# Patient Record
Sex: FEMALE | Race: WHITE | NOT HISPANIC OR LATINO | Employment: FULL TIME | URBAN - METROPOLITAN AREA
[De-identification: names, ages, dates, MRNs, and addresses within clinical notes are randomized per-mention and may not be internally consistent; named-entity substitution may affect disease eponyms.]

---

## 2016-12-30 NOTE — PATIENT INSTRUCTIONS

## 2016-12-30 NOTE — PROGRESS NOTES
SUBJECTIVE:     CC: Audra Almanzar is an 26 year old woman who presents for preventive health visit.     Healthy Habits:    Do you get at least three servings of calcium containing foods daily (dairy, green leafy vegetables, etc.)? yes and no, taking calcium and/or vitamin D supplement: yes - calcium    Amount of exercise or daily activities, outside of work: 0 day(s) per week    Problems taking medications regularly No    Medication side effects: No    Have you had an eye exam in the past two years? no    Do you see a dentist twice per year? yes    Do you have sleep apnea, excessive snoring or daytime drowsiness?daytime drowsiness    Asthma is well controlled  Sleep is better    -------------------------------------    Today's PHQ-2 Score:   PHQ-2 ( 1999 Pf[employer]) 10/16/2015 10/16/2015   Q1: Little interest or pleasure in doing things 0 0   Q2: Feeling down, depressed or hopeless 0 0   PHQ-2 Score 0 0       Abuse: Current or Past(Physical, Sexual or Emotional)- No  Do you feel safe in your environment - Yes    Social History   Substance Use Topics     Smoking status: Former Smoker -- 0.00 packs/day     Types: Cigarettes     Quit date: 03/11/2014     Smokeless tobacco: Never Used      Comment: 1-2 per month     Alcohol Use: Yes      Comment: 4 per week     The patient does not drink >3 drinks per day nor >7 drinks per week.    Recent Labs   Lab Test  03/11/15   1049  01/05/11   1027   CHOL  138  135   HDL  73  60   LDL  48  62   TRIG  87  64   CHOLHDLRATIO  1.9  2.2       Reviewed orders with patient.  Reviewed health maintenance and updated orders accordingly - Yes    Mammo Decision Support:  Mammogram not appropriate for this patient based on age.    Pertinent mammograms are reviewed under the imaging tab.  History of abnormal Pap smear: YES - updated in Problem List and Health Maintenance accordingly  All Histories reviewed and updated in Epic.      ROS:  C: NEGATIVE for fever, chills, change in weight  I:  NEGATIVE for worrisome rashes, moles or lesions  E: NEGATIVE for vision changes or irritation  ENT: NEGATIVE for ear, mouth and throat problems  R: NEGATIVE for significant cough or SOB  B: NEGATIVE for masses, tenderness or discharge  CV: NEGATIVE for chest pain, palpitations or peripheral edema  GI: NEGATIVE for nausea, abdominal pain, heartburn, or change in bowel habits  : NEGATIVE for unusual urinary or vaginal symptoms. Periods are regular.  M: NEGATIVE for significant arthralgias or myalgia  N: NEGATIVE for weakness, dizziness or paresthesias  E: NEGATIVE for temperature intolerance, skin/hair changes  P: NEGATIVE for changes in mood or affect    Problem list, Medication list, Allergies, and Medical/Social/Surgical histories reviewed in [facility] and updated as appropriate.  Labs reviewed in EPIC  OBJECTIVE:     /82 mmHg  Pulse 89  Temp(Src) 97.4  F (36.3  C) (Oral)  Wt 165 lb 4.8 oz (74.98 kg)  SpO2 96%  EXAM:  GENERAL: healthy, alert and no distress  EYES: Eyes grossly normal to inspection, PERRL and conjunctivae and sclerae normal  HENT: ear canals and TM's normal, nose and mouth without ulcers or lesions  NECK: no adenopathy, no asymmetry, masses, or scars and thyroid normal to palpation  RESP: lungs clear to auscultation - no rales, rhonchi or wheezes  BREAST: normal without masses, tenderness or nipple discharge and no palpable axillary masses or adenopathy  CV: regular rate and rhythm, normal S1 S2, no S3 or S4, no murmur, click or rub, no peripheral edema and peripheral pulses strong  ABDOMEN: soft, nontender, no hepatosplenomegaly, no masses and bowel sounds normal  MS: no gross musculoskeletal defects noted, no edema  SKIN: no suspicious lesions or rashes  NEURO: Normal strength and tone, mentation intact and speech normal  PSYCH: mentation appears normal, affect normal/bright    ASSESSMENT/PLAN:         ICD-10-CM    1. Routine general medical examination at a health care facility Z00.00   "  2. Mild persistent asthma, uncomplicated J45.30 fluticasone-salmeterol (ADVAIR) 250-50 MCG/DOSE diskus inhaler     albuterol (PROAIR HFA/PROVENTIL HFA/VENTOLIN HFA) 108 (90 BASE) MCG/ACT Inhaler   3. Foot dermatitis L30.9 desoximetasone (TOPICORT) 0.25 %   4. CONSTANTINO (generalized anxiety disorder) F41.1 FLUoxetine (PROZAC) 20 MG capsule   5. Encounter for surveillance of contraceptive pills Z30.41 drospirenone-ethinyl estradiol (OCELLA) 3-0.03 MG per tablet     Asthma controlled, anxiety under control. She will stay on prozac until the end of the year and then consider starting therapy. Doesn't have time to start therapy yet. Return to clinic for any new or worsening symptoms or go to ER Urgent care in off hours      COUNSELING:   Reviewed preventive health counseling, as reflected in patient instructions         reports that she quit smoking about 2 years ago. Her smoking use included Cigarettes. She smoked 0.00 packs per day. She has never used smokeless tobacco.    Estimated body mass index is 24.97 kg/(m^2) as calculated from the following:    Height as of 3/11/15: 5' 8.23\" (1.733 m).    Weight as of this encounter: 165 lb 4.8 oz (74.98 kg).       Counseling Resources:  ATP IV Guidelines  Pooled Cohorts Equation Calculator  Breast Cancer Risk Calculator  FRAX Risk Assessment  ICSI Preventive Guidelines  Dietary Guidelines for Americans, 2010  USDA's MyPlate  ASA Prophylaxis  Lung CA Screening    Mora Chang PA-C  [facility] [place]   "

## 2017-01-05 ENCOUNTER — OFFICE VISIT (OUTPATIENT)
Dept: FAMILY MEDICINE | Facility: CLINIC | Age: 27
End: 2017-01-05
Payer: COMMERCIAL

## 2017-01-05 VITALS
DIASTOLIC BLOOD PRESSURE: 82 MMHG | OXYGEN SATURATION: 96 % | BODY MASS INDEX: 24.97 KG/M2 | HEART RATE: 89 BPM | TEMPERATURE: 97.4 F | WEIGHT: 165.3 LBS | SYSTOLIC BLOOD PRESSURE: 122 MMHG

## 2017-01-05 DIAGNOSIS — L30.9 FOOT DERMATITIS: ICD-10-CM

## 2017-01-05 DIAGNOSIS — Z30.41 ENCOUNTER FOR SURVEILLANCE OF CONTRACEPTIVE PILLS: ICD-10-CM

## 2017-01-05 DIAGNOSIS — Z00.00 ROUTINE GENERAL MEDICAL EXAMINATION AT A HEALTH CARE FACILITY: Primary | ICD-10-CM

## 2017-01-05 DIAGNOSIS — J45.30 MILD PERSISTENT ASTHMA, UNCOMPLICATED: ICD-10-CM

## 2017-01-05 DIAGNOSIS — F41.1 GAD (GENERALIZED ANXIETY DISORDER): ICD-10-CM

## 2017-01-05 PROCEDURE — 99395 PREV VISIT EST AGE 18-39: CPT | Performed by: PHYSICIAN ASSISTANT

## 2017-01-05 RX ORDER — ALBUTEROL SULFATE 90 UG/1
2 AEROSOL, METERED RESPIRATORY (INHALATION) EVERY 6 HOURS PRN
Qty: 3 INHALER | Refills: 3 | Status: SHIPPED | OUTPATIENT
Start: 2017-01-05 | End: 2020-08-26

## 2017-01-05 RX ORDER — DESOXIMETASONE 2.5 MG/ML
SPRAY TOPICAL 2 TIMES DAILY
Qty: 3 BOTTLE | Refills: 3 | Status: SHIPPED | OUTPATIENT
Start: 2017-01-05 | End: 2019-06-05

## 2017-01-05 RX ORDER — DROSPIRENONE AND ETHINYL ESTRADIOL 0.03MG-3MG
1 KIT ORAL DAILY
Qty: 84 TABLET | Refills: 4 | Status: SHIPPED | OUTPATIENT
Start: 2017-01-05 | End: 2018-03-20

## 2017-01-05 NOTE — Clinical Note
My Asthma Action Plan  Name: Audra Almanzar   YOB: 1990  Date: 12/30/2016   My doctor: Meka Hinkle   My clinic: [facility] [place]      My Control Medicine: Fluticasone+salmeterol (Advair) -  Diskus 250/50 mcg        Dose: 1 puff every 12 hours  My Rescue Medicine: Albuterol (Proair/Ventolin/Proventil) HFA        Dose: 2 puffs every 6 hours as needed   My Asthma Severity: mild persistent  Avoid your asthma triggers: Triggers        GREEN ZONE   Good Control    I feel good    No cough or wheeze    Can work, sleep and play without asthma symptoms       Take your asthma control medicine every day.     1. If exercise triggers your asthma, take your rescue medication    15 minutes before exercise or sports, and    During exercise if you have asthma symptoms  2. Spacer to use with inhaler: If you have a spacer, make sure to use it with your inhaler             YELLOW ZONE Getting Worse  I have ANY of these:    I do not feel good    Cough or wheeze    Chest feels tight    Wake up at night   1. Keep taking your Green Zone medications  2. Start taking your rescue medicine:    every 20 minutes for up to 1 hour. Then every 4 hours for 24-48 hours.  3. If you stay in the Yellow Zone for more than 12-24 hours, contact your doctor.  4. If you do not return to the Green Zone in 12-24 hours or you get worse, start taking your oral steroid medicine if prescribed by your provider.           RED ZONE Medical Alert - Get Help  I have ANY of these:    I feel awful    Medicine is not helping    Breathing getting harder    Trouble walking or talking    Nose opens wide to breathe       1. Take your rescue medicine NOW  2. If your provider has prescribed an oral steroid medicine, start taking it NOW  3. Call your doctor NOW  4. If you are still in the Red Zone after 20 minutes and you have not reached your doctor:    Take your rescue medicine again and    Call 911 or go to the emergency room right  away    See your regular doctor within 2 weeks of an Emergency Room or Urgent Care visit for follow-up treatment.        The above medication may be given at school or day care?: N/A (Adult Patient)  Child can carry and use inhaler(s) at school with approval of school nurse?: N/A (Adult Patient)    Electronically signed by: Saumya Meyer, December 30, 2016    Annual Reminders:  Meet with Asthma Educator,  Flu Shot in the Fall, consider Pneumonia Vaccination for patients with asthma (aged 19 and older).    Pharmacy[facility]    [facility] [place] [address]  [facility] [place], [place] - [address]  [facility]  [facility] [place] - [address]  [facility] [place] [facility] [address]  [facility] [place], [place] - [address]                    Asthma Triggers  How To Control Things That Make Your Asthma Worse    Triggers are things that make your asthma worse.  Look at the list below to help you find your triggers and what you can do about them.  You can help prevent asthma flare-ups by staying away from your triggers.      Trigger                                                          What you can do   Cigarette Smoke  Tobacco smoke can make asthma worse. Do not allow smoking in your home, car or around you.  Be sure no one smokes at a child s day care or school.  If you smoke, ask your health care provider for ways to help you quit.  Ask family members to quit too.  Ask your health care provider for a referral to Quit Plan to help you quit smoking, or call 3-253-075-PLAN.     Colds, Flu, Bronchitis  These are common triggers of asthma. Wash your hands often.  Don t touch your eyes, nose or mouth.  Get a flu shot every year.     Dust Mites  These are tiny bugs that live in cloth or carpet. They are too small to see. Wash sheets and blankets in hot water every week.    Encase pillows and mattress in dust mite proof covers.  Avoid having carpet if you can. If you have carpet, vacuum weekly.   Use a dust mask and HEPA vacuum.   Pollen and Outdoor Mold  Some people are allergic to trees, grass, or weed pollen, or molds. Try to keep your windows closed.  Limit time out doors when pollen count is high.   Ask you health care provider about taking medicine during allergy season.     Animal Dander  Some people are allergic to skin flakes, urine or saliva from pets with fur or feathers. Keep pets with fur or feathers out of your home.    If you can t keep the pet outdoors, then keep the pet out of your bedroom.  Keep the bedroom door closed.  Keep pets off cloth furniture and away from stuffed toys.     Mice, Rats, and Cockroaches  Some people are allergic to the waste from these pests.   Cover food and garbage.  Clean up spills and food crumbs.  Store grease in the refrigerator.   Keep food out of the bedroom.   Indoor Mold  This can be a trigger if your home has high moisture. Fix leaking faucets, pipes, or other sources of water.   Clean moldy surfaces.  Dehumidify basement if it is damp and smelly.   Smoke, Strong Odors, and Sprays  These can reduce air quality. Stay away from strong odors and sprays, such as perfume, powder, hair spray, paints, smoke incense, paint, cleaning products, candles and new carpet.   Exercise or Sports  Some people with asthma have this trigger. Be active!  Ask your doctor about taking medicine before sports or exercise to prevent symptoms.    Warm up for 5-10 minutes before and after sports or exercise.     Other Triggers of Asthma  Cold air:  Cover your nose and mouth with a scarf.  Sometimes laughing or crying can be a trigger.  Some medicines and food can trigger asthma.

## 2017-01-05 NOTE — NURSING NOTE
"Chief Complaint   Patient presents with     Physical       Initial /82 mmHg  Pulse 89  Temp(Src) 97.4  F (36.3  C) (Oral)  Wt 165 lb 4.8 oz (74.98 kg)  SpO2 96% Estimated body mass index is 24.97 kg/(m^2) as calculated from the following:    Height as of 3/11/15: 5' 8.23\" (1.733 m).    Weight as of this encounter: 165 lb 4.8 oz (74.98 kg).  BP completed using cuff size: regular  Saumya Meyer MA      "

## 2017-06-01 ENCOUNTER — OFFICE VISIT (OUTPATIENT)
Dept: FAMILY MEDICINE | Facility: CLINIC | Age: 27
End: 2017-06-01
Payer: COMMERCIAL

## 2017-06-01 VITALS
WEIGHT: 166.4 LBS | TEMPERATURE: 97.8 F | BODY MASS INDEX: 25.22 KG/M2 | OXYGEN SATURATION: 100 % | HEART RATE: 80 BPM | DIASTOLIC BLOOD PRESSURE: 90 MMHG | SYSTOLIC BLOOD PRESSURE: 130 MMHG | HEIGHT: 68 IN

## 2017-06-01 DIAGNOSIS — Z01.84 IMMUNITY STATUS TESTING: ICD-10-CM

## 2017-06-01 DIAGNOSIS — F41.1 GAD (GENERALIZED ANXIETY DISORDER): ICD-10-CM

## 2017-06-01 DIAGNOSIS — F90.0 ATTENTION DEFICIT HYPERACTIVITY DISORDER (ADHD), PREDOMINANTLY INATTENTIVE TYPE: Primary | ICD-10-CM

## 2017-06-01 DIAGNOSIS — Z11.1 SCREENING EXAMINATION FOR PULMONARY TUBERCULOSIS: ICD-10-CM

## 2017-06-01 DIAGNOSIS — E55.9 VITAMIN D DEFICIENCY: ICD-10-CM

## 2017-06-01 DIAGNOSIS — J45.30 MILD PERSISTENT ASTHMA WITHOUT COMPLICATION: ICD-10-CM

## 2017-06-01 LAB — DEPRECATED CALCIDIOL+CALCIFEROL SERPL-MC: 16 UG/L (ref 20–75)

## 2017-06-01 PROCEDURE — 82306 VITAMIN D 25 HYDROXY: CPT | Performed by: PHYSICIAN ASSISTANT

## 2017-06-01 PROCEDURE — 86787 VARICELLA-ZOSTER ANTIBODY: CPT | Performed by: PHYSICIAN ASSISTANT

## 2017-06-01 PROCEDURE — 86480 TB TEST CELL IMMUN MEASURE: CPT | Performed by: PHYSICIAN ASSISTANT

## 2017-06-01 PROCEDURE — 99213 OFFICE O/P EST LOW 20 MIN: CPT | Performed by: PHYSICIAN ASSISTANT

## 2017-06-01 PROCEDURE — 36415 COLL VENOUS BLD VENIPUNCTURE: CPT | Performed by: PHYSICIAN ASSISTANT

## 2017-06-01 NOTE — PATIENT INSTRUCTIONS
probiotic 30 billion units (3rd party testing)-  i'll contact you with results of vit d  Fish oil with EPA DHA 1-2 grams daily. Put in the freezer and take night with meal  Continue your medications  Good luck on your boards!  Return to clinic for any new or worsening symptoms or go to ER Urgent care in off hours

## 2017-06-01 NOTE — LETTER
[address] [address]  [phone]  Dept: [phone]      6/1/2017    Re: Audra Almanzar      TO WHOM IT MAY CONCERN:    Due to Audra Almanzar's disabilities with diagnosis of ADHD, anxiety disorder and depression, I request that she have the accomodation of taking the NCLEX exam in a private room.     Cordially,            Mora Chang PA-C  [facility] [place]

## 2017-06-01 NOTE — LETTER
[facility]  [address] [place] [id number] [phone]      June 2, 2017      Audra FABIOLA Jenelle  [address] [address]          Dear MsNeville Almanzar,    The results of your recent lab tests show low vitamin D. Start over the counter vitamin D3 5,000 IU's daily. Repeat labs in 6 weeks and we'll readjust. It's very important to repeat labs so we can be sure the level doesn't become toxic (this is a fat soluble vitamin)  Enclosed is a copy of these results.  If you have any further questions or problems, please contact our office.    Sincerely,        Mora Chang PA-C        Results for orders placed or performed in visit on 06/01/17   Varicella Zoster Virus Antibody IgG   Result Value Ref Range    Varicella Zoster Virus Antibody IgG 3.1 (H) 0.0 - 0.8 AI   Vitamin D Deficiency   Result Value Ref Range    Vitamin D Deficiency screening 16 (L) 20 - 75 ug/L

## 2017-06-01 NOTE — PROGRESS NOTES
SUBJECTIVE:                                                    Audra Almanzar is a 27 year old female who presents to clinic today for the following health issues:    Has forms to fill out for school. Chicken pox titer and get a TB done.     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    Currently taking multivitamin, metabolism booster? Thinks green tea extract. Also calcium with vitamin D        Problem list and histories reviewed & adjusted, as indicated.  Additional history: as documented    ROS:  C: NEGATIVE for fever, chills, change in weight  E/M: NEGATIVE for ear, mouth and throat problems  R: NEGATIVE for significant cough or SOB  CV: NEGATIVE for chest pain, palpitations or peripheral edema    Patient Active Problem List   Diagnosis     CARDIOVASCULAR SCREENING; LDL GOAL LESS THAN 130     Foot dermatitis     Mild persistent asthma     Dermatitis     Anxiety disorder     ASCUS (atypical squamous cells of undetermined significance) on Pap smear     Mild persistent asthma, uncomplicated     Past Surgical History:   Procedure Laterality Date     ENT SURGERY         Social History   Substance Use Topics     Smoking status: Former Smoker     Packs/day: 0.00     Types: Cigarettes     Quit date: 3/11/2014     Smokeless tobacco: Never Used      Comment: 1-2 per month     Alcohol use Yes      Comment: 4 per week     Family History   Problem Relation Age of Onset     Gynecology Mother      Depression/Anxiety Mother      Asthma Father      CANCER Maternal Grandmother      bladder     Respiratory Maternal Grandfather      Respiratory Paternal Grandmother      Alcohol/Drug Brother            Problem list, Medication list, Allergies, and Medical/Social/Surgical histories reviewed in [facility] and updated as appropriate.  Labs reviewed in EPIC    OBJECTIVE:                                                    /90 (BP Location: Right arm, Patient Position:  "Chair, Cuff Size: Adult Regular)  Pulse 80  Temp 97.8  F (36.6  C) (Oral)  Ht 5' 7.72\" (1.72 m)  Wt 166 lb 6.4 oz (75.5 kg)  LMP 05/16/2017 (Approximate)  SpO2 100%  BMI 25.51 kg/m2 Body mass index is 25.51 kg/(m^2).   GENERAL:: healthy, alert and no distress         ASSESSMENT/PLAN:                                                        ICD-10-CM    1. Attention deficit hyperactivity disorder (ADHD), predominantly inattentive type F90.0    2. Screening examination for pulmonary tuberculosis Z11.1 M Tuberculosis by Quantiferon   3. Immunity status testing Z01.84 Varicella Zoster Virus Antibody IgG   4. CONSTANTINO (generalized anxiety disorder) F41.1    5. Mild persistent asthma without complication J45.30    6. Vitamin D deficiency E55.9 Vitamin D Deficiency     > 15 minutes were spent with the patient and / or family present in education and / or counseling regarding appropriate supplements to help optimize her health, signing paperwork and discussing her ADD and anxiety.  This represented more than 50% of the time spent interacting with the patient during this visit.     Patient Instructions   probiotic 30 billion units (3rd party testing)-  i'll contact you with results of vit d  Fish oil with EPA DHA 1-2 grams daily. Put in the freezer and take night with meal  Continue your medications  Good luck on your boards!  Return to clinic for any new or worsening symptoms or go to ER Urgent care in off hours            Estimated body mass index is 25.51 kg/(m^2) as calculated from the following:    Height as of this encounter: 5' 7.72\" (1.72 m).    Weight as of this encounter: 166 lb 6.4 oz (75.5 kg).       Mora Riggs [facility] [place]  [facility] [place]    "

## 2017-06-01 NOTE — MR AVS SNAPSHOT
After Visit Summary   6/1/2017    Audra Almanzar    MRN: [id number]           Patient Information     Date Of Birth          1990        Visit Information        Provider Department      6/1/2017 8:00 AM Mora Chang PA-C [facility] [place]        Today's Diagnoses     Attention deficit hyperactivity disorder (ADHD), predominantly inattentive type    -  1    Screening examination for pulmonary tuberculosis        Immunity status testing        CONSTANTINO (generalized anxiety disorder)        Mild persistent asthma without complication        Vitamin D deficiency          Care Instructions    probiotic 30 billion units (3rd party testing)-  i'll contact you with results of vit d  Fish oil with EPA DHA 1-2 grams daily. Put in the freezer and take night with meal  Continue your medications  Good luck on your boards!  Return to clinic for any new or worsening symptoms or go to ER Urgent care in off hours              Follow-ups after your visit        Who to contact     If you have questions or need follow up information about today's clinic visit or your schedule please contact [facility] [place] directly at [phone].  Normal or non-critical lab and imaging results will be communicated to you by [employer], letter or phone within 4 business days after the clinic has received the results. If you do not hear from us within 7 days, please contact the clinic through Oreet or phone. If you have a critical or abnormal lab result, we will notify you by phone as soon as possible.  Submit refill requests through Mayomi or call your pharmacy and they will forward the refill request to us. Please allow 3 business days for your refill to be completed.          Additional Information About Your Visit        [employer] Information     Mayomi lets you send messages to your doctor, view your test results, renew your prescriptions, schedule appointments and more. To sign up, go to  [email or web address] . Click on \"Log in\" on the left side of the screen, which will take you to the Welcome page. Then click on \"Sign up Now\" on the right side of the page.     You will be asked to enter the access code listed below, as well as some personal information. Please follow the directions to create your username and password.     Your access code is: [email or web address]  Expires: 2017  8:38 AM     Your access code will  in 90 days. If you need help or a new code, please call your [place] clinic or [phone].        Care EveryWhere ID     This is your Care EveryWhere ID. This could be used by other organizations to access your [place] medical records  [id number]        Your Vitals Were     Pulse Temperature Height Last Period Pulse Oximetry BMI (Body Mass Index)    80 97.8  F (36.6  C) (Oral) 5' 7.72\" (1.72 m) 2017 (Approximate) 100% 25.51 kg/m2       Blood Pressure from Last 3 Encounters:   17 130/90   17 122/82   10/16/15 122/86    Weight from Last 3 Encounters:   17 166 lb 6.4 oz (75.5 kg)   17 165 lb 4.8 oz (75 kg)   03/11/15 150 lb 9.6 oz (68.3 kg)              We Performed the Following     M Tuberculosis by Quantiferon     Varicella Zoster Virus Antibody IgG     Vitamin D Deficiency        Primary Care Provider Office Phone # Fax #    Meka Hinkle PA-C [phone]       XXX NO INFO FOUND XXX XXX  XXX [place] [address]        Thank you!     Thank you for choosing [facility] [place]  for your care. Our goal is always to provide you with excellent care. Hearing back from our patients is one way we can continue to improve our services. Please take a few minutes to complete the written survey that you may receive in the mail after your visit with us. Thank you!             Your Updated Medication List - Protect others around you: Learn how to safely use, store and throw away your medicines at www.disposemymeds.org.          This list is " accurate as of: 6/1/17  8:38 AM.  Always use your most recent med list.                   Brand Name Dispense Instructions for use    albuterol 108 (90 BASE) MCG/ACT Inhaler    PROAIR HFA/PROVENTIL HFA/VENTOLIN HFA    3 Inhaler    Inhale 2 puffs into the lungs every 6 hours as needed for shortness of breath / dyspnea       Biotin 5000 MCG Caps      Take  by mouth daily.       cetirizine 10 MG tablet    ZYRTEC ALLERGY    90 tablet    Take 1 tablet (10 mg) by mouth daily       desoximetasone 0.25 %    TOPICORT    3 Bottle    Apply topically 2 times daily       drospirenone-ethinyl estradiol 3-0.03 MG per tablet    OCELLA    84 tablet    Take 1 tablet by mouth daily       FLUoxetine 20 MG capsule    PROzac    90 capsule    Take 1 capsule (20 mg) by mouth daily Annual exam needed before any further refills, call [phone] to schedule       fluticasone-salmeterol 250-50 MCG/DOSE diskus inhaler    ADVAIR    3 Inhaler    Inhale 1 puff into the lungs every 12 hours

## 2017-06-01 NOTE — NURSING NOTE
"Chief Complaint   Patient presents with     Forms     Varicella     Titer     Mantoux Administration       Initial /90 (BP Location: Right arm, Patient Position: Chair, Cuff Size: Adult Regular)  Pulse 80  Temp 97.8  F (36.6  C) (Oral)  Ht 5' 7.72\" (1.72 m)  Wt 166 lb 6.4 oz (75.5 kg)  LMP 05/16/2017 (Approximate)  SpO2 100%  BMI 25.51 kg/m2 Estimated body mass index is 25.51 kg/(m^2) as calculated from the following:    Height as of this encounter: 5' 7.72\" (1.72 m).    Weight as of this encounter: 166 lb 6.4 oz (75.5 kg).  Medication Reconciliation: complete     Caleb Crane MA      "

## 2017-06-02 LAB — VZV IGG SER QL IA: 3.1 AI (ref 0–0.8)

## 2017-06-02 NOTE — PROGRESS NOTES
Please advise patient results show low vitamin D. Start over the counter vitamin D3 5,000 IU's daily. Repeat labs in 6 weeks and we'll readjust. It's very important to repeat labs so we can be sure the level doesn't become toxic (this is a fat soluble vitamin)

## 2017-06-05 LAB
M TB TUBERC IFN-G BLD QL: NEGATIVE
M TB TUBERC IFN-G/MITOGEN IGNF BLD: 0 IU/ML

## 2017-06-26 ENCOUNTER — TELEPHONE (OUTPATIENT)
Dept: FAMILY MEDICINE | Facility: CLINIC | Age: 27
End: 2017-06-26

## 2018-03-16 NOTE — PATIENT INSTRUCTIONS
proboitioc 50 billion units daily  Fish oil with EPA DHA 2,000 mg daily  CATALINA-E 400 mg on an empty stomach., every 4 weeks or so, you can increase by 400 up 1600 mg daily.   Vitamin D3 2,000 IU's daily  Exercise daily    Wean down to 10 mg of prozac  Check in with me over mychart in 4-6 weeks on how you're doing  If needed, start therapy. Number is paperwork.  Return to clinic for any new or worsening symptoms or go to ER Urgent care in off hours    Preventive Health Recommendations  Female Ages 26 - 39  Yearly exam:   See your health care provider every year in order to    Review health changes.     Discuss preventive care.      Review your medicines if you your doctor has prescribed any.    Until age 30: Get a Pap test every three years (more often if you have had an abnormal result).    After age 30: Talk to your doctor about whether you should have a Pap test every 3 years or have a Pap test with HPV screening every 5 years.   You do not need a Pap test if your uterus was removed (hysterectomy) and you have not had cancer.  You should be tested each year for STDs (sexually transmitted diseases), if you're at risk.   Talk to your provider about how often to have your cholesterol checked.  If you are at risk for diabetes, you should have a diabetes test (fasting glucose).  Shots: Get a flu shot each year. Get a tetanus shot every 10 years.   Nutrition:     Eat at least 5 servings of fruits and vegetables each day.    Eat whole-grain bread, whole-wheat pasta and brown rice instead of white grains and rice.    Talk to your provider about Calcium and Vitamin D.     Lifestyle    Exercise at least 150 minutes a week (30 minutes a day, 5 days of the week). This will help you control your weight and prevent disease.    Limit alcohol to one drink per day.    No smoking.     Wear sunscreen to prevent skin cancer.    See your dentist every six months for an exam and cleaning.

## 2018-03-16 NOTE — PROGRESS NOTES
SUBJECTIVE:   CC: Audra Almanzar is an 28 year old woman who presents for preventive health visit.     Healthy Habits:    Do you get at least three servings of calcium containing foods daily (dairy, green leafy vegetables, etc.)? no, taking calcium and/or vitamin D supplement: no    Amount of exercise or daily activities, outside of work: 0 day(s) per week    Problems taking medications regularly Yes forgetting    Medication side effects: No    Have you had an eye exam in the past two years? no    Do you see a dentist twice per year? yes    Do you have sleep apnea, excessive snoring or daytime drowsiness?no      Feels like she can feel her heart skip a beat at night when she lays down, has been going on a while. It happens at least a few times per night. No dizziness, lightheadedness, or shortness of breath associated with this.     Asthma has been controlled    Anxiety has been well controlled. Thinking about decreasing dose of prozac    Reports occasional vaginal itching. Comes and goes        Today's PHQ-2 Score:   PHQ-2 ( 1999 Pfizer) 3/20/2018 6/1/2017   Q1: Little interest or pleasure in doing things 0 0   Q2: Feeling down, depressed or hopeless 0 0   PHQ-2 Score 0 0       Abuse: Current or Past(Physical, Sexual or Emotional)- No  Do you feel safe in your environment - Yes    Social History   Substance Use Topics     Smoking status: Former Smoker     Packs/day: 0.00     Types: Cigarettes     Quit date: 3/11/2014     Smokeless tobacco: Never Used      Comment: 1-2 per month     Alcohol use Yes      Comment: 4 per week     If you drink alcohol do you typically have >3 drinks per day or >7 drinks per week? No                     Reviewed orders with patient.  Reviewed health maintenance and updated orders accordingly - Yes  Labs reviewed in EPIC  BP Readings from Last 3 Encounters:   03/20/18 122/78   06/01/17 130/90   01/05/17 122/82    Wt Readings from Last 3 Encounters:   03/20/18 168 lb 12.8 oz (76.6  kg)   06/01/17 166 lb 6.4 oz (75.5 kg)   01/05/17 165 lb 4.8 oz (75 kg)                    Mammogram not appropriate for this patient based on age.    Pertinent mammograms are reviewed under the imaging tab.  History of abnormal Pap smear: NO - age 30- 65 PAP every 3 years recommended    Reviewed and updated as needed this visit by clinical staff  Tobacco  Allergies  Meds         Reviewed and updated as needed this visit by Provider            ROS:  C: NEGATIVE for fever, chills, change in weight  I: NEGATIVE for worrisome rashes, moles or lesions  E: NEGATIVE for vision changes or irritation  ENT: NEGATIVE for ear, mouth and throat problems  R: NEGATIVE for significant cough or SOB  B: NEGATIVE for masses, tenderness or discharge  CV: NEGATIVE for chest pain/chest pressure, dyspnea on exertion, lower extremity edema, orthopnea and syncope or near-syncope  GI: NEGATIVE for nausea, abdominal pain, heartburn, or change in bowel habits   female: normal menses and no unusual urinary symptoms  M: NEGATIVE for significant arthralgias or myalgia  N: NEGATIVE for weakness, dizziness or paresthesias  E: NEGATIVE for temperature intolerance, skin/hair changes  P: NEGATIVE for changes in mood or affect    OBJECTIVE:   /78  Pulse 70  Temp 98.2  F (36.8  C) (Oral)  Wt 168 lb 12.8 oz (76.6 kg)  SpO2 99%  BMI 25.88 kg/m2  EXAM:  GENERAL: healthy, alert and no distress  EYES: Eyes grossly normal to inspection, PERRL and conjunctivae and sclerae normal  HENT: ear canals and TM's normal, nose and mouth without ulcers or lesions  NECK: no adenopathy, no asymmetry, masses, or scars and thyroid normal to palpation  RESP: lungs clear to auscultation - no rales, rhonchi or wheezes  BREAST: normal without masses, tenderness or nipple discharge and no palpable axillary masses or adenopathy  CV: regular rate and rhythm, normal S1 S2, no S3 or S4, no murmur, click or rub, no peripheral edema and peripheral pulses  strong  ABDOMEN: soft, nontender, no hepatosplenomegaly, no masses and bowel sounds normal   (female): normal female external genitalia, normal urethral meatus, vaginal mucosa pink, moist, well rugated, and normal cervix/adnexa/uterus without masses or discharge  MS: no gross musculoskeletal defects noted, no edema  SKIN: no suspicious lesions or rashes  NEURO: Normal strength and tone, mentation intact and speech normal  PSYCH: mentation appears normal, affect normal/bright    ASSESSMENT/PLAN:       ICD-10-CM    1. Routine general medical examination at a health care facility Z00.00    2. CONSTANTINO (generalized anxiety disorder) F41.1 FLUoxetine (PROZAC) 10 MG capsule     MENTAL HEALTH REFERRAL  - Adult; Outpatient Treatment; Individual/Couples/Family/Group Therapy/Health Psychology; [facility] [place]: [facility] [phone]; We will contact you to schedule the appointment or please call with any questions     OFFICE/OUTPT VISIT,EST,LEVL IV   3. Heart palpitations R00.2 TSH with free T4 reflex     CBC with platelets     Ferritin     Basic metabolic panel  (Ca, Cl, CO2, Creat, Gluc, K, Na, BUN)     OFFICE/OUTPT VISIT,EST,LEVL IV   4. Vaginal itching L29.8 Wet prep     OFFICE/OUTPT VISIT,EST,LEVL IV   5. Mild persistent asthma without complication J45.30 Asthma Action Plan (AAP)     OFFICE/OUTPT VISIT,EST,LEVL IV   6. Encounter for surveillance of contraceptive pills Z30.41 drospirenone-ethinyl estradiol (OCELLA) 3-0.03 MG per tablet   7. Screening for malignant neoplasm of cervix Z12.4 Pap imaged thin layer screen with HPV - recommended age 30 - 65 years (select HPV order below)     HPV High Risk Types DNA Cervical   8. BV (bacterial vaginosis) N76.0 metroNIDAZOLE (METROGEL) 0.75 % vaginal gel    B96.89      Anxiety well controlled. We'll start weaning off prozac. She'll follow up with a counselor if anxiety returns when she starts to wean. Supplements as recommended. Check labs to rule out anemia, thyroid  "abnormality and electrolyte disturbance for heart palpitations. If normal, referral to cardiology. Wet prep today. Refills of medications given. Return to clinic for any new or worsening symptoms or go to ER Urgent care in off hours    > 25 minutes were spent with the patient and / or family present in education and / or counseling face to face regarding the above issues in addition to the preventative physical.  This represented more than 50% of the time spent interacting with the patient during this visit.     COUNSELING:   Reviewed preventive health counseling, as reflected in patient instructions         reports that she quit smoking about 4 years ago. Her smoking use included Cigarettes. She smoked 0.00 packs per day. She has never used smokeless tobacco.    Estimated body mass index is 25.88 kg/(m^2) as calculated from the following:    Height as of 6/1/17: 5' 7.72\" (1.72 m).    Weight as of this encounter: 168 lb 12.8 oz (76.6 kg).       Counseling Resources:  ATP IV Guidelines  Pooled Cohorts Equation Calculator  Breast Cancer Risk Calculator  FRAX Risk Assessment  ICSI Preventive Guidelines  Dietary Guidelines for Americans, 2010  USDA's MyPlate  ASA Prophylaxis  Lung CA Screening    Mora Chang PA-C  [facility] [place]  "

## 2018-03-20 ENCOUNTER — OFFICE VISIT (OUTPATIENT)
Dept: FAMILY MEDICINE | Facility: CLINIC | Age: 28
End: 2018-03-20
Payer: COMMERCIAL

## 2018-03-20 VITALS
DIASTOLIC BLOOD PRESSURE: 78 MMHG | BODY MASS INDEX: 25.88 KG/M2 | HEART RATE: 70 BPM | OXYGEN SATURATION: 99 % | TEMPERATURE: 98.2 F | WEIGHT: 168.8 LBS | SYSTOLIC BLOOD PRESSURE: 122 MMHG

## 2018-03-20 DIAGNOSIS — N89.8 VAGINAL ITCHING: ICD-10-CM

## 2018-03-20 DIAGNOSIS — N76.0 BV (BACTERIAL VAGINOSIS): ICD-10-CM

## 2018-03-20 DIAGNOSIS — Z12.4 SCREENING FOR MALIGNANT NEOPLASM OF CERVIX: ICD-10-CM

## 2018-03-20 DIAGNOSIS — Z11.3 SCREEN FOR STD (SEXUALLY TRANSMITTED DISEASE): ICD-10-CM

## 2018-03-20 DIAGNOSIS — B96.89 BV (BACTERIAL VAGINOSIS): ICD-10-CM

## 2018-03-20 DIAGNOSIS — F41.1 GAD (GENERALIZED ANXIETY DISORDER): ICD-10-CM

## 2018-03-20 DIAGNOSIS — Z00.00 ROUTINE GENERAL MEDICAL EXAMINATION AT A HEALTH CARE FACILITY: Primary | ICD-10-CM

## 2018-03-20 DIAGNOSIS — Z30.41 ENCOUNTER FOR SURVEILLANCE OF CONTRACEPTIVE PILLS: ICD-10-CM

## 2018-03-20 DIAGNOSIS — R00.2 HEART PALPITATIONS: ICD-10-CM

## 2018-03-20 DIAGNOSIS — J45.30 MILD PERSISTENT ASTHMA WITHOUT COMPLICATION: ICD-10-CM

## 2018-03-20 LAB
ANION GAP SERPL CALCULATED.3IONS-SCNC: 8 MMOL/L (ref 3–14)
BUN SERPL-MCNC: 14 MG/DL (ref 7–30)
CALCIUM SERPL-MCNC: 8.6 MG/DL (ref 8.5–10.1)
CHLORIDE SERPL-SCNC: 106 MMOL/L (ref 94–109)
CO2 SERPL-SCNC: 25 MMOL/L (ref 20–32)
CREAT SERPL-MCNC: 0.79 MG/DL (ref 0.52–1.04)
ERYTHROCYTE [DISTWIDTH] IN BLOOD BY AUTOMATED COUNT: 11.7 % (ref 10–15)
FERRITIN SERPL-MCNC: 37 NG/ML (ref 12–150)
GFR SERPL CREATININE-BSD FRML MDRD: 85 ML/MIN/1.7M2
GLUCOSE SERPL-MCNC: 89 MG/DL (ref 70–99)
HCT VFR BLD AUTO: 37.9 % (ref 35–47)
HGB BLD-MCNC: 12.8 G/DL (ref 11.7–15.7)
MCH RBC QN AUTO: 31.7 PG (ref 26.5–33)
MCHC RBC AUTO-ENTMCNC: 33.8 G/DL (ref 31.5–36.5)
MCV RBC AUTO: 94 FL (ref 78–100)
PLATELET # BLD AUTO: 272 10E9/L (ref 150–450)
POTASSIUM SERPL-SCNC: 3.4 MMOL/L (ref 3.4–5.3)
RBC # BLD AUTO: 4.04 10E12/L (ref 3.8–5.2)
SODIUM SERPL-SCNC: 139 MMOL/L (ref 133–144)
SPECIMEN SOURCE: ABNORMAL
TSH SERPL DL<=0.005 MIU/L-ACNC: 1.62 MU/L (ref 0.4–4)
WBC # BLD AUTO: 6.5 10E9/L (ref 4–11)
WET PREP SPEC: ABNORMAL

## 2018-03-20 PROCEDURE — 85027 COMPLETE CBC AUTOMATED: CPT | Performed by: PHYSICIAN ASSISTANT

## 2018-03-20 PROCEDURE — 82728 ASSAY OF FERRITIN: CPT | Performed by: PHYSICIAN ASSISTANT

## 2018-03-20 PROCEDURE — 80048 BASIC METABOLIC PNL TOTAL CA: CPT | Performed by: PHYSICIAN ASSISTANT

## 2018-03-20 PROCEDURE — 87624 HPV HI-RISK TYP POOLED RSLT: CPT | Performed by: PHYSICIAN ASSISTANT

## 2018-03-20 PROCEDURE — 99214 OFFICE O/P EST MOD 30 MIN: CPT | Mod: 25 | Performed by: PHYSICIAN ASSISTANT

## 2018-03-20 PROCEDURE — 87491 CHLMYD TRACH DNA AMP PROBE: CPT | Performed by: PHYSICIAN ASSISTANT

## 2018-03-20 PROCEDURE — 87591 N.GONORRHOEAE DNA AMP PROB: CPT | Performed by: PHYSICIAN ASSISTANT

## 2018-03-20 PROCEDURE — 99395 PREV VISIT EST AGE 18-39: CPT | Performed by: PHYSICIAN ASSISTANT

## 2018-03-20 PROCEDURE — 87210 SMEAR WET MOUNT SALINE/INK: CPT | Performed by: PHYSICIAN ASSISTANT

## 2018-03-20 PROCEDURE — G0123 SCREEN CERV/VAG THIN LAYER: HCPCS | Performed by: PHYSICIAN ASSISTANT

## 2018-03-20 PROCEDURE — 36415 COLL VENOUS BLD VENIPUNCTURE: CPT | Performed by: PHYSICIAN ASSISTANT

## 2018-03-20 PROCEDURE — 84443 ASSAY THYROID STIM HORMONE: CPT | Performed by: PHYSICIAN ASSISTANT

## 2018-03-20 RX ORDER — METRONIDAZOLE 7.5 MG/G
1 GEL VAGINAL AT BEDTIME
Qty: 70 G | Refills: 0 | Status: SHIPPED | OUTPATIENT
Start: 2018-03-20 | End: 2018-03-25

## 2018-03-20 RX ORDER — FLUOXETINE 10 MG/1
10 CAPSULE ORAL DAILY
Qty: 60 CAPSULE | Refills: 1 | Status: SHIPPED | OUTPATIENT
Start: 2018-03-20 | End: 2018-07-27

## 2018-03-20 RX ORDER — DROSPIRENONE AND ETHINYL ESTRADIOL 0.03MG-3MG
1 KIT ORAL DAILY
Qty: 84 TABLET | Refills: 4 | Status: SHIPPED | OUTPATIENT
Start: 2018-03-20 | End: 2019-05-28

## 2018-03-20 NOTE — LETTER
March 28, 2018    Audra Almanzar  [address] [place] [address]    Dear Audra,  We are happy to inform you that your PAP smear result from 03/20/18 is normal.  We are now able to do a follow up test on PAP smears. The DNA test is for HPV (Human Papilloma Virus). Cervical cancer is closely linked with certain types of HPV. Your result showed no evidence of high risk HPV.  Therefore we recommend you return in 3 years for your next pap smear.  You will still need to return to the clinic every year for an annual exam and other preventive tests.  Please contact the clinic at [phone] with any questions.  Sincerely,    Mora Chang PA-C/tonya

## 2018-03-20 NOTE — MR AVS SNAPSHOT
After Visit Summary   3/20/2018    Audra Almanzar    MRN: [id number]           Patient Information     Date Of Birth          1990        Visit Information        Provider Department      3/20/2018 9:00 AM Mora Chang PA-C [facility] [place]        Today's Diagnoses     Routine general medical examination at a health care facility    -  1    Screening for malignant neoplasm of cervix        Mild persistent asthma without complication        Encounter for surveillance of contraceptive pills        CONSTANTINO (generalized anxiety disorder)        Heart palpitations          Care Instructions    proboitioc 50 billion units daily  Fish oil with EPA DHA 2,000 mg daily  CATALINA-E 400 mg on an empty stomach., every 4 weeks or so, you can increase by 400 up 1600 mg daily.   Vitamin D3 2,000 IU's daily  Exercise daily    Wean down to 10 mg of prozac  Check in with me over mychart in 4-6 weeks on how you're doing  If needed, start therapy. Number is paperwork.  Return to clinic for any new or worsening symptoms or go to ER Urgent care in off hours    Preventive Health Recommendations  Female Ages 26 - 39  Yearly exam:   See your health care provider every year in order to    Review health changes.     Discuss preventive care.      Review your medicines if you your doctor has prescribed any.    Until age 30: Get a Pap test every three years (more often if you have had an abnormal result).    After age 30: Talk to your doctor about whether you should have a Pap test every 3 years or have a Pap test with HPV screening every 5 years.   You do not need a Pap test if your uterus was removed (hysterectomy) and you have not had cancer.  You should be tested each year for STDs (sexually transmitted diseases), if you're at risk.   Talk to your provider about how often to have your cholesterol checked.  If you are at risk for diabetes, you should have a diabetes test (fasting glucose).  Shots: Get a  flu shot each year. Get a tetanus shot every 10 years.   Nutrition:     Eat at least 5 servings of fruits and vegetables each day.    Eat whole-grain bread, whole-wheat pasta and brown rice instead of white grains and rice.    Talk to your provider about Calcium and Vitamin D.     Lifestyle    Exercise at least 150 minutes a week (30 minutes a day, 5 days of the week). This will help you control your weight and prevent disease.    Limit alcohol to one drink per day.    No smoking.     Wear sunscreen to prevent skin cancer.    See your dentist every six months for an exam and cleaning.            Follow-ups after your visit        Additional Services     MENTAL HEALTH REFERRAL  - Adult; Outpatient Treatment; Individual/Couples/Family/Group Therapy/Health Psychology; [facility] [place]: [facility] [phone]; We will contact you to schedule the appointment or please call with any questions       All scheduling is subject to the client's specific insurance plan & benefits, provider/location availability, and provider clinical specialities.  Please arrive 15 minutes early for your first appointment and bring your completed paperwork.    Please be aware that coverage of these services is subject to the terms and limitations of your health insurance plan.  Call member services at your health plan with any benefit or coverage questions.                            Who to contact     If you have questions or need follow up information about today's clinic visit or your schedule please contact [facility] directly at [phone].  Normal or non-critical lab and imaging results will be communicated to you by MyChart, letter or phone within 4 business days after the clinic has received the results. If you do not hear from us within 7 days, please contact the clinic through MyChart or phone. If you have a critical or abnormal lab result, we will notify you by phone as soon as possible.  Submit refill  "requests through [employer] or call your pharmacy and they will forward the refill request to us. Please allow 3 business days for your refill to be completed.          Additional Information About Your Visit        [employer] Information     [employer] lets you send messages to your doctor, view your test results, renew your prescriptions, schedule appointments and more. To sign up, go to [email or web address] . Click on \"Log in\" on the left side of the screen, which will take you to the Welcome page. Then click on \"Sign up Now\" on the right side of the page.     You will be asked to enter the access code listed below, as well as some personal information. Please follow the directions to create your username and password.     Your access code is: [email or web address]  Expires: 2018  9:25 AM     Your access code will  in 90 days. If you need help or a new code, please call your [facility] clinic or [phone].        Care EveryWhere ID     This is your Care EveryWhere ID. This could be used by other organizations to access your [facility] medical records  [id number]        Your Vitals Were     Pulse Temperature Pulse Oximetry BMI (Body Mass Index)          70 98.2  F (36.8  C) (Oral) 99% 25.88 kg/m2         Blood Pressure from Last 3 Encounters:   18 122/78   17 130/90   17 122/82    Weight from Last 3 Encounters:   18 168 lb 12.8 oz (76.6 kg)   17 166 lb 6.4 oz (75.5 kg)   17 165 lb 4.8 oz (75 kg)              We Performed the Following     Asthma Action Plan (AAP)     Basic metabolic panel  (Ca, Cl, CO2, Creat, Gluc, K, Na, BUN)     CBC with platelets     Ferritin     HPV High Risk Types DNA Cervical     MENTAL HEALTH REFERRAL  - Adult; Outpatient Treatment; Individual/Couples/Family/Group Therapy/Health Psychology; [facility]: [facility] [phone]; We will contact you to schedule the appointment or please call with any questions     Pap imaged thin layer screen with " HPV - recommended age 30 - 65 years (select HPV order below)     TSH with free T4 reflex          Today's Medication Changes          These changes are accurate as of 3/20/18  9:25 AM.  If you have any questions, ask your nurse or doctor.               These medicines have changed or have updated prescriptions.        Dose/Directions    * FLUoxetine 20 MG capsule   Commonly known as:  PROzac   This may have changed:  Another medication with the same name was added. Make sure you understand how and when to take each.   Used for:  CONSTANTINO (generalized anxiety disorder)   Changed by:  Mora Chang PA-C        Dose:  20 mg   Take 1 capsule (20 mg) by mouth daily Annual exam needed before any further refills, call [phone] to schedule   Quantity:  90 capsule   Refills:  3       * FLUoxetine 10 MG capsule   Commonly known as:  PROzac   This may have changed:  You were already taking a medication with the same name, and this prescription was added. Make sure you understand how and when to take each.   Used for:  CONSTANTINO (generalized anxiety disorder)   Changed by:  Mora Chang PA-C        Dose:  10 mg   Take 1 capsule (10 mg) by mouth daily   Quantity:  60 capsule   Refills:  1       * Notice:  This list has 2 medication(s) that are the same as other medications prescribed for you. Read the directions carefully, and ask your doctor or other care provider to review them with you.         Where to get your medicines      These medications were sent to [place] [address] [place] [id number]  [address] [facility] [address]     Phone:  [phone]     drospirenone-ethinyl estradiol 3-0.03 MG per tablet    FLUoxetine 10 MG capsule                Primary Care Provider Office Phone # Fax #    Meka Hinkle PA-C [phone]       XXX NO INFO FOUND XXX XXX  XXX [place] [address]        Equal Access to Services     CATHRYN NINA : saul Scales  juany chrissajan arambulalucila hightower. So [facility] [phone].    ATENCIÓN: Si brian patrick, tiene a shaikh disposición servicios gratuitos de asistencia lingüística. Damaris al [phone].    We comply with applicable federal civil rights laws and [place] laws. We do not discriminate on the basis of race, color, national origin, age, disability, sex, sexual orientation, or gender identity.            Thank you!     Thank you for choosing [facility]  [place] your [place]. Our goal is always to provide you with excellent care. Hearing back from our patients is one way we can continue to improve our services. Please take a few minutes to complete the written survey that you may receive in the mail after your visit with us. Thank you!             Your Updated Medication List - Protect others around you: Learn how to safely use, store and throw away your medicines at www.disposemymeds.org.          This list is accurate as of 3/20/18  9:25 AM.  Always use your most recent med list.                   Brand Name Dispense Instructions for use Diagnosis    albuterol 108 (90 BASE) MCG/ACT Inhaler    PROAIR HFA/PROVENTIL HFA/VENTOLIN HFA    3 Inhaler    Inhale 2 puffs into the lungs every 6 hours as needed for shortness of breath / dyspnea    Mild persistent asthma, uncomplicated       Biotin 5000 MCG Caps      Take  by mouth daily.        cetirizine 10 MG tablet    ZYRTEC ALLERGY    90 tablet    Take 1 tablet (10 mg) by mouth daily    Chronic rhinitis       desoximetasone 0.25 %    TOPICORT    3 Bottle    Apply topically 2 times daily    Foot dermatitis       drospirenone-ethinyl estradiol 3-0.03 MG per tablet    OCELLA    84 tablet    Take 1 tablet by mouth daily    Encounter for surveillance of contraceptive pills       * FLUoxetine 20 MG capsule    PROzac    90 capsule    Take 1 capsule (20 mg) by mouth daily Annual exam needed before any further refills, call [phone] to  schedule    CONSTANTINO (generalized anxiety disorder)       * FLUoxetine 10 MG capsule    PROzac    60 capsule    Take 1 capsule (10 mg) by mouth daily    CONSTANTINO (generalized anxiety disorder)       fluticasone-salmeterol 250-50 MCG/DOSE diskus inhaler    ADVAIR    3 Inhaler    Inhale 1 puff into the lungs every 12 hours    Mild persistent asthma, uncomplicated       * Notice:  This list has 2 medication(s) that are the same as other medications prescribed for you. Read the directions carefully, and ask your doctor or other care provider to review them with you.

## 2018-03-20 NOTE — LETTER
March 23, 2018      Audra Almanzar  [address] [place] [address]        Dear Audra,    I am writing to let you know that your results came back positive for Bacterial Vaginosis, we sent over a prescription to your pharmacy. We also wanted to make sure you got scheduled for cardiology, if they have not called you yet, their number is [phone]. Otherwise your other labs came back normal!        Resulted Orders   TSH with free T4 reflex   Result Value Ref Range    TSH 1.62 0.40 - 4.00 mU/L   CBC with platelets   Result Value Ref Range    WBC 6.5 4.0 - 11.0 10e9/L    RBC Count 4.04 3.8 - 5.2 10e12/L    Hemoglobin 12.8 11.7 - 15.7 g/dL    Hematocrit 37.9 35.0 - 47.0 %    MCV 94 78 - 100 fl    MCH 31.7 26.5 - 33.0 pg    MCHC 33.8 31.5 - 36.5 g/dL    RDW 11.7 10.0 - 15.0 %    Platelet Count 272 150 - 450 10e9/L   Ferritin   Result Value Ref Range    Ferritin 37 12 - 150 ng/mL   Basic metabolic panel  (Ca, Cl, CO2, Creat, Gluc, K, Na, BUN)   Result Value Ref Range    Sodium 139 133 - 144 mmol/L    Potassium 3.4 3.4 - 5.3 mmol/L    Chloride 106 94 - 109 mmol/L    Carbon Dioxide 25 20 - 32 mmol/L    Anion Gap 8 3 - 14 mmol/L    Glucose 89 70 - 99 mg/dL    Urea Nitrogen 14 7 - 30 mg/dL    Creatinine 0.79 0.52 - 1.04 mg/dL    GFR Estimate 85 >60 mL/min/1.7m2      Comment:      Non  GFR Calc    GFR Estimate If Black >90 >60 mL/min/1.7m2      Comment:       GFR Calc    Calcium 8.6 8.5 - 10.1 mg/dL   Wet prep   Result Value Ref Range    Specimen Description Vagina     Wet Prep Clue cells seen (A)     Wet Prep No Trichomonas seen     Wet Prep No yeast seen    NEISSERIA GONORRHOEA PCR   Result Value Ref Range    Specimen Descrip Cervix     N Gonorrhea PCR Negative NEG^Negative      Comment:      Negative for N. gonorrhoeae rRNA by transcription mediated amplification.  A negative result by transcription mediated amplification does not preclude   the presence of N.  gonorrhoeae infection because results are dependent on   proper and adequate collection, absence of inhibitors, and sufficient rRNA to   be detected.     CHLAMYDIA TRACHOMATIS PCR   Result Value Ref Range    Specimen Description Cervix     Chlamydia Trachomatis PCR Negative NEG^Negative      Comment:      Negative for C. trachomatis rRNA by transcription mediated amplification.  A negative result by transcription mediated amplification does not preclude   the presence of C. trachomatis infection because results are dependent on   proper and adequate collection, absence of inhibitors, and sufficient rRNA to   be detected.         If you have any questions or concerns, please call the clinic at the number listed above.       Sincerely,        Mora Chang PA-C

## 2018-03-21 DIAGNOSIS — Z30.41 ENCOUNTER FOR SURVEILLANCE OF CONTRACEPTIVE PILLS: ICD-10-CM

## 2018-03-21 RX ORDER — DROSPIRENONE AND ETHINYL ESTRADIOL 0.03MG-3MG
KIT ORAL
Qty: 84 TABLET | Refills: 4 | OUTPATIENT
Start: 2018-03-21

## 2018-03-21 ASSESSMENT — ASTHMA QUESTIONNAIRES: ACT_TOTALSCORE: 22

## 2018-03-21 NOTE — TELEPHONE ENCOUNTER
Duplicate RX script for birth control filled on 3/20/18 for 82 with 4 refills.    Milli Marie CMA

## 2018-03-21 NOTE — PROGRESS NOTES
Please advise patient results are normal. Referral to cardiology placed to talk about Holter monitor. Please have her schedule.

## 2018-03-22 LAB
C TRACH DNA SPEC QL NAA+PROBE: NEGATIVE
N GONORRHOEA DNA SPEC QL NAA+PROBE: NEGATIVE
SPECIMEN SOURCE: NORMAL
SPECIMEN SOURCE: NORMAL

## 2018-03-23 LAB
COPATH REPORT: NORMAL
PAP: NORMAL

## 2018-03-27 LAB
FINAL DIAGNOSIS: NORMAL
HPV HR 12 DNA CVX QL NAA+PROBE: NEGATIVE
HPV16 DNA SPEC QL NAA+PROBE: NEGATIVE
HPV18 DNA SPEC QL NAA+PROBE: NEGATIVE
SPECIMEN DESCRIPTION: NORMAL
SPECIMEN SOURCE CVX/VAG CYTO: NORMAL

## 2018-05-24 DIAGNOSIS — J45.30 MILD PERSISTENT ASTHMA, UNCOMPLICATED: ICD-10-CM

## 2018-05-24 NOTE — TELEPHONE ENCOUNTER
"Requested Prescriptions   Pending Prescriptions Disp Refills     ADVAIR DISKUS 250-50 MCG/DOSE diskus inhaler [Pharmacy Med Name: ADVAIR 250-50 DISKUS]  0     Sig: INHALE 1 PUFF INTO THE LUNGS EVERY 12 HOURS    Inhaled Steroids Protocol Passed    5/24/2018 12:19 PM       Passed - Patient is age 12 or older       Passed - Asthma control assessment score within normal limits in last 6 months    Please review ACT score.          Passed - Recent (6 mo) or future (30 days) visit within the authorizing provider's specialty    Patient had office visit in the last 6 months or has a visit in the next 30 days with authorizing provider or within the authorizing provider's specialty.  See \"Patient Info\" tab in inbasket, or \"Choose Columns\" in Meds & Orders section of the refill encounter.            Last Written Prescription Date:  01/15/17  Last Fill Quantity: 3,  # refills: 3   Last office visit: 3/20/2018 with prescribing provider:     Future Office Visit:    ACT Total Scores 3/11/2015 10/16/2015 3/20/2018   ACT TOTAL SCORE 23 - -   ASTHMA ER VISITS 0 = None - -   ASTHMA HOSPITALIZATIONS 0 = None - -   ACT TOTAL SCORE (Goal Greater than or Equal to 20) - 25 22   In the past 12 months, how many times did you visit the emergency room for your asthma without being admitted to the hospital? - 0 0   In the past 12 months, how many times were you hospitalized overnight because of your asthma? - 0 0     Routing refill request to provider for review/approval because:  A break in medication    Pao MunroeRN BSN  [facility]  [phone]          "

## 2018-07-27 DIAGNOSIS — F41.1 GAD (GENERALIZED ANXIETY DISORDER): ICD-10-CM

## 2018-07-27 DIAGNOSIS — J45.30 MILD PERSISTENT ASTHMA, UNCOMPLICATED: ICD-10-CM

## 2018-07-27 NOTE — TELEPHONE ENCOUNTER
"Requested Prescriptions   Pending Prescriptions Disp Refills     fluticasone-salmeterol (ADVAIR DISKUS) 250-50 MCG/DOSE diskus inhaler    Last Written Prescription Date:  5/24/18  Last Fill Quantity: 2,  # refills: 0   Last office visit: 3/20/2018 with prescribing provider:  3/20/18   Future Office Visit:     2 Inhaler 0    Inhaled Steroids Protocol Passed    7/27/2018 11:34 AM       Passed - Patient is age 12 or older       Passed - Asthma control assessment score within normal limits in last 6 months    Please review ACT score.          Passed - Recent (6 mo) or future (30 days) visit within the authorizing provider's specialty    Patient had office visit in the last 6 months or has a visit in the next 30 days with authorizing provider or within the authorizing provider's specialty.  See \"Patient Info\" tab in inbasket, or \"Choose Columns\" in Meds & Orders section of the refill encounter.              "

## 2018-07-27 NOTE — TELEPHONE ENCOUNTER
"Requested Prescriptions   Pending Prescriptions Disp Refills     FLUoxetine (PROZAC) 10 MG capsule [Pharmacy Med Name: FLUOXETINE HCL 10 MG CAPSULE] 60 capsule 1    Last Written Prescription Date:  03/20/2018  Last Fill Quantity: 60 capsule,  # refills: 1   Last office visit: 3/20/2018 with prescribing provider:  Mora Coffey   Future Office Visit:     Sig: TAKE 1 CAPSULE (10 MG) BY MOUTH DAILY    SSRIs Protocol Passed    7/27/2018  1:10 AM       Passed - Recent (12 mo) or future (30 days) visit within the authorizing provider's specialty    Patient had office visit in the last 12 months or has a visit in the next 30 days with authorizing provider or within the authorizing provider's specialty.  See \"Patient Info\" tab in inbasket, or \"Choose Columns\" in Meds & Orders section of the refill encounter.           Passed - Patient is age 18 or older       Passed - No active pregnancy on record       Passed - No positive pregnancy test in last 12 months        PHQ-9 SCORE 2/25/2013 10/16/2015   Total Score 2 -   Total Score - 6     CONSTANTINO-7 SCORE 3/5/2014 3/11/2015 10/16/2015   Total Score - 4 -   Total Score - - 3   Total Score 2 - -         "

## 2018-07-31 RX ORDER — FLUOXETINE 10 MG/1
CAPSULE ORAL
Qty: 60 CAPSULE | Refills: 1 | Status: SHIPPED | OUTPATIENT
Start: 2018-07-31 | End: 2018-12-27

## 2018-07-31 NOTE — TELEPHONE ENCOUNTER
Prescription approved per [facility] Refill Protocol.    Malissa Martin RN   [facility]

## 2018-07-31 NOTE — TELEPHONE ENCOUNTER
Prescription approved per [facility] Refill Protocol.    Dipti Santamaria RN  [facility]

## 2018-08-02 ENCOUNTER — OFFICE VISIT (OUTPATIENT)
Dept: CARDIOLOGY | Facility: CLINIC | Age: 28
End: 2018-08-02
Attending: PHYSICIAN ASSISTANT
Payer: COMMERCIAL

## 2018-08-02 VITALS
BODY MASS INDEX: 26.06 KG/M2 | SYSTOLIC BLOOD PRESSURE: 122 MMHG | WEIGHT: 166 LBS | HEART RATE: 91 BPM | DIASTOLIC BLOOD PRESSURE: 82 MMHG | OXYGEN SATURATION: 95 % | HEIGHT: 67 IN

## 2018-08-02 DIAGNOSIS — R00.2 PALPITATIONS: Primary | ICD-10-CM

## 2018-08-02 PROCEDURE — 93000 ELECTROCARDIOGRAM COMPLETE: CPT | Performed by: INTERNAL MEDICINE

## 2018-08-02 PROCEDURE — 99204 OFFICE O/P NEW MOD 45 MIN: CPT | Performed by: INTERNAL MEDICINE

## 2018-08-02 RX ORDER — OMEGA-3S/DHA/EPA/FISH OIL/D3 300MG-1000
1 CAPSULE ORAL DAILY
COMMUNITY

## 2018-08-02 NOTE — LETTER
8/2/2018    Mora Chang PA-C  [address]  [facility] [address]    RE: Audra Almanzar       Dear Colleague,    I had the pleasure of seeing Audra Almanzar in the [facility].    Visit note dictated. Dictation reference number - [id number].          PAST MEDICAL HISTORY:    Past Medical History:   Diagnosis Date     Anxiety disorder 2010    counselling, fluoxitine, rare ativan     Dermatitis 3/29/2012     Mild persistent asthma      UTI (lower urinary tract infection) jan29,2013       PAST SURGICAL HISTORY:    Past Surgical History:   Procedure Laterality Date     ENT SURGERY         FAMILY HISTORY:    Family History   Problem Relation Age of Onset     Gynecology Mother      abnormal cells on pap     Depression/Anxiety Mother      Asthma Father      Cancer Maternal Grandmother      bladder     Hypertension Maternal Grandmother      Pacemaker Maternal Grandfather      Chronic Obstructive Pulmonary Disease Maternal Grandfather      Heart Surgery Maternal Grandfather      Chronic Obstructive Pulmonary Disease Paternal Grandmother      Heart Failure Paternal Grandmother      Coronary Artery Disease Paternal Grandmother      Alcohol/Drug Brother        SOCIAL HISTORY:    Social History     Social History     Marital status: Single     Spouse name: N/A     Number of children: N/A     Years of education: N/A     Social History Main Topics     Smoking status: Former Smoker     Packs/day: 0.00     Types: Cigarettes     Quit date: 3/11/2014     Smokeless tobacco: Never Used     Alcohol use Yes      Comment: 4 per week     Drug use: No     Sexual activity: Not Currently     Partners: Male     Birth control/ protection: Condom, Pill     Other Topics Concern     Parent/Sibling W/ Cabg, Mi Or Angioplasty Before 65f 55m? No     Social History Narrative       PHYSICAL EXAM:    Vitals: /82 (BP Location: Right arm, Patient Position: Chair, Cuff Size: Adult Regular)   "Pulse 91  Ht 1.702 m (5' 7\")  Wt 75.3 kg (166 lb)  SpO2 95%  BMI 26 kg/m2  Wt Readings from Last 5 Encounters:   08/02/18 75.3 kg (166 lb)   03/20/18 76.6 kg (168 lb 12.8 oz)   06/01/17 75.5 kg (166 lb 6.4 oz)   01/05/17 75 kg (165 lb 4.8 oz)   03/11/15 68.3 kg (150 lb 9.6 oz)       Constitutional: Comfortable at rest. Cooperative, alert and oriented, well developed, well nourished.  Eyes: Pupils equal and round, conjunctivae and lids unremarkable, sclera white, no xanthalasma,   ENT: Satisfactory dentition.  No cyanosis or pallor.  Neck: Carotid pulses are full and equal bilaterally, no carotid bruit, no thyromegaly     Respiratory: Normal respiratory effort with symmetrical chest wall movements and no use of accessory muscles. Good air entry with normal breath sounds and no rales or wheeze.  Cardiovascular: Normal jugular venous pulse and pressure.  Normal carotid pulse character and volume.  No carotid bruit.  Apical impulse is undisplaced and normal to palpation without parasternal heave or thrill.  Heart sounds are normal and regular. No audible murmur. No S3, S4 or friction rub.    GI: Soft, nontender, no hepatosplenomegaly, no masses, active bowel sounds.    Skin: No rash, erythema, ecchymosis.  Musculoskeletal: Normal muscle tone and strength. Normal gait. No spinal deformities.  Neuropsychiatric: Oriented to time place and person.  Affect normal.  No gross motor deficits.  Extremities: No edema. No clubbing or deformities.        Encounter Diagnosis   Name Primary?     Palpitations Yes       Orders Placed This Encounter   Procedures     Follow-Up with Cardiologist     EKG 12-lead complete w/read - Clinics (performed today)     ZIO Patch Monitor     Echocardiogram       CC  REFERRING PROVIDER:  Mora Chang PA-C  [address] [facility] [address]  [place] [address]                  Thank you for allowing me to participate in the care of your patient.      Sincerely,     Dawood Varner MD "     [facility] [place]    cc:   Mora Chang PA-C  [address] [facility] [address]  [place] [address]

## 2018-08-02 NOTE — PROGRESS NOTES
"Visit note dictated. Dictation reference number - [id number].          PAST MEDICAL HISTORY:    Past Medical History:   Diagnosis Date     Anxiety disorder 2010    counselling, fluoxitine, rare ativan     Dermatitis 3/29/2012     Mild persistent asthma      UTI (lower urinary tract infection) jan29,2013       PAST SURGICAL HISTORY:    Past Surgical History:   Procedure Laterality Date     ENT SURGERY         FAMILY HISTORY:    Family History   Problem Relation Age of Onset     Gynecology Mother      abnormal cells on pap     Depression/Anxiety Mother      Asthma Father      Cancer Maternal Grandmother      bladder     Hypertension Maternal Grandmother      Pacemaker Maternal Grandfather      Chronic Obstructive Pulmonary Disease Maternal Grandfather      Heart Surgery Maternal Grandfather      Chronic Obstructive Pulmonary Disease Paternal Grandmother      Heart Failure Paternal Grandmother      Coronary Artery Disease Paternal Grandmother      Alcohol/Drug Brother        SOCIAL HISTORY:    Social History     Social History     Marital status: Single     Spouse name: N/A     Number of children: N/A     Years of education: N/A     Social History Main Topics     Smoking status: Former Smoker     Packs/day: 0.00     Types: Cigarettes     Quit date: 3/11/2014     Smokeless tobacco: Never Used     Alcohol use Yes      Comment: 4 per week     Drug use: No     Sexual activity: Not Currently     Partners: Male     Birth control/ protection: Condom, Pill     Other Topics Concern     Parent/Sibling W/ Cabg, Mi Or Angioplasty Before 65f 55m? No     Social History Narrative       PHYSICAL EXAM:    Vitals: /82 (BP Location: Right arm, Patient Position: Chair, Cuff Size: Adult Regular)  Pulse 91  Ht 1.702 m (5' 7\")  Wt 75.3 kg (166 lb)  SpO2 95%  BMI 26 kg/m2  Wt Readings from Last 5 Encounters:   08/02/18 75.3 kg (166 lb)   03/20/18 76.6 kg (168 lb 12.8 oz)   06/01/17 75.5 kg (166 lb 6.4 oz)   01/05/17 75 kg (165 lb 4.8 " oz)   03/11/15 68.3 kg (150 lb 9.6 oz)       Constitutional: Comfortable at rest. Cooperative, alert and oriented, well developed, well nourished.  Eyes: Pupils equal and round, conjunctivae and lids unremarkable, sclera white, no xanthalasma,   ENT: Satisfactory dentition.  No cyanosis or pallor.  Neck: Carotid pulses are full and equal bilaterally, no carotid bruit, no thyromegaly     Respiratory: Normal respiratory effort with symmetrical chest wall movements and no use of accessory muscles. Good air entry with normal breath sounds and no rales or wheeze.  Cardiovascular: Normal jugular venous pulse and pressure.  Normal carotid pulse character and volume.  No carotid bruit.  Apical impulse is undisplaced and normal to palpation without parasternal heave or thrill.  Heart sounds are normal and regular. No audible murmur. No S3, S4 or friction rub.    GI: Soft, nontender, no hepatosplenomegaly, no masses, active bowel sounds.    Skin: No rash, erythema, ecchymosis.  Musculoskeletal: Normal muscle tone and strength. Normal gait. No spinal deformities.  Neuropsychiatric: Oriented to time place and person.  Affect normal.  No gross motor deficits.  Extremities: No edema. No clubbing or deformities.        Encounter Diagnosis   Name Primary?     Palpitations Yes       Orders Placed This Encounter   Procedures     Follow-Up with Cardiologist     EKG 12-lead complete w/read - Clinics (performed today)     ZIO Patch Monitor     Echocardiogram       CC  REFERRING PROVIDER:  Mora Chang PA-C  [address] [facility] [address]  [place] [address]

## 2018-08-02 NOTE — PROGRESS NOTES
"Service Date: 08/02/2018      PRIMARY CARE AND REFERRING PROVIDER:  AMANDA Cobos       REASON FOR VISIT:  Palpitations.       HISTORY OF PRESENT ILLNESS:    Thank you for referring Audra Almanzar to [facility].  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  She is here for evaluation of palpitations.      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  They occur only at rest, last a few seconds.  She describes them as \"a fluttering I feel in my chest followed by a skipped beat.\"  Not associated with any other symptoms, never woke her up from sleep and do not occur during exercise.  She is a rare caffeine user, drinks 4-6 glasses of wine per week, no tobacco.  Does not do any targeted exercise but generally remains fairly active.  She has historically attributed these to the tremendous stress she was under when completing her associate nursing degree.  She has started her bachelor's degree and is trying to juggle finances.  There is no family history of arrhythmias.     She is on long-term fluoxetine.  I recently decreased the dose from 20 mg to 10 mg daily.      ECG done today shows normal sinus rhythm at 71 BPM with a MO of 126 milliseconds and QTc of 393 milliseconds.      Recent labs also satisfactory with a fantastic lipid profile (total cholesterol 138, HDL 73, LDL 48, triglycerides 87).  Normal electrolytes with a creatinine of 0.8.  TSH 1.62.  Hemoglobin 12.8.      CURRENT MEDICATIONS:     1.  Albuterol inhaler for mild asthma.   2.  Calcium and vitamin D supplement.   3.  Cetirizine 10 mg daily.   4.  Ocella oral contraceptive pill (drospirenone/ethinyl estradiol).    5.  Fluoxetine 10 mg daily.   6.  Fluticasone inhaler.   7.  Over-the-counter omega-3 fatty acids.  "   8.  Probiotic.    9.  S-Adenosylmethionine.       ALLERGIES:  No known allergies.      REVIEW OF SYSTEMS:  A comprehensive review of systems was completed and the pertinent positives are documented in the HPI.      PHYSICAL EXAMINATION:   VITAL SIGNS:  Heart rate is 98 per minute and regular, blood pressure 122/82, height 1.702 meters (5 feet 7 inches), weight 75.2 kilograms (166 pounds), BMI 26 kg/m2.   GENERAL:  The patient's physical exam is entirely unremarkable.   CARDIOVASCULAR:  Has normal heart sounds, no murmur, no carotid bruit.   EXTREMITIES:  Good peripheral pulses.   ABDOMEN:  Soft and nontender.    NECK:  Thyroid exam normal.       DIAGNOSES:  Palpitations.      ASSESSMENT AND PLAN:    As the patient herself has identified, I suspect the palpitations are related to the stress she is under from doing her nursing degree.  The symptoms are very transitory and only occur when resting.  TSH is normal, resting ECG normal, physical exam normal.  To allay her fears, I will obtain a 7-day heart rhythm monitor (Zio patch) and heart ultrasound and echocardiogram.  We will follow up with her in clinic to review results.      Thank you for referring this young patient.  If you have any questions, please do not hesitate to contact me.        William Parikh MD       cc:   AMANDA Cobos    [address], [address]   [place] [address]         WILLIAM PARIKH MD             D: 2018   T: 2018   MT: MACY      Name:     EDGAR SINGH   MRN:      [id number]        Account:      [id number]   :      1990           Service Date: 2018      Document: [id number]

## 2018-08-02 NOTE — PATIENT INSTRUCTIONS
1. 7 day Ziopatch heart monitor.    2. Echocardiogram (heart ultrasound).    3. Clinic follow up to review test results.    If you have any questions or concerns, please call my nurse Brittny Bright at [phone].

## 2018-08-02 NOTE — MR AVS SNAPSHOT
After Visit Summary   8/2/2018    Audra Almanzar    MRN: [id number]           Patient Information     Date Of Birth          1990        Visit Information        Provider Department      8/2/2018 12:45 PM Dawood Varner MD [facility]        Today's Diagnoses     Palpitations    -  1      Care Instructions    1. 7 day Ziopatch heart monitor.    2. Echocardiogram (heart ultrasound).    3. Clinic follow up to review test results.    If you have any questions or concerns, please call my nurse Brittny Bright at [phone].            Follow-ups after your visit        Additional Services     Follow-Up with Cardiologist                 Future tests that were ordered for you today     Open Future Orders        Priority Expected Expires Ordered    ZIO Patch Monitor Routine 8/2/2018 8/2/2019 8/2/2018    Echocardiogram Routine 8/2/2018 8/2/2019 8/2/2018    Follow-Up with Cardiologist Routine 8/30/2018 8/2/2019 8/2/2018            Who to contact     If you have questions or need follow up information about today's clinic visit or your schedule please contact [facility] directly at [phone].  Normal or non-critical lab and imaging results will be communicated to you by MyChart, letter or phone within 4 business days after the clinic has received the results. If you do not hear from us within 7 days, please contact the clinic through [employer] or phone. If you have a critical or abnormal lab result, we will notify you by phone as soon as possible.  Submit refill requests through [employer] or call your pharmacy and they will forward the refill request to us. Please allow 3 business days for your refill to be completed.          Additional Information About Your Visit        MyChart Information     [employer] gives you secure access to your electronic health record. If you see a primary care provider, you can also send messages  "to your care team and make appointments. If you have questions, please call your primary care clinic.  If you do not have a primary care provider, please call [phone] and they will assist you.        Care EveryWhere ID     This is your Care EveryWhere ID. This could be used by other organizations to access your [facility] medical records  [id number]        Your Vitals Were     Pulse Height Pulse Oximetry BMI (Body Mass Index)          91 1.702 m (5' 7\") 95% 26 kg/m2         Blood Pressure from Last 3 Encounters:   08/02/18 122/82   03/20/18 122/78   06/01/17 130/90    Weight from Last 3 Encounters:   08/02/18 75.3 kg (166 lb)   03/20/18 76.6 kg (168 lb 12.8 oz)   06/01/17 75.5 kg (166 lb 6.4 oz)              We Performed the Following     EKG 12-lead complete w/read - Clinics (performed today)        Primary Care Provider Office Phone # Fax #    Mora Chang PA-C [phone]       [address] [facility] [address]  [facility] [address]        Equal Access to Services     [facility] CenterANASTASIIA AH: Hadii aad ku hadasho Soomaali, waaxda luqadaha, qaybta kaalmada adeegyada, waxay eliein haysonn cyrus vences lafuentes ah. So [facility] [phone].    ATENCIÓN: Si habla español, tiene a shaikh disposición servicios gratuitos de asistencia lingüística. Janinaame al [phone].    We comply with applicable federal civil rights laws and [place] laws. We do not discriminate on the basis of race, color, national origin, age, disability, sex, sexual orientation, or gender identity.            Thank you!     Thank you for choosing [facility]  for your care. Our goal is always to provide you with excellent care. Hearing back from our patients is one way we can continue to improve our services. Please take a few minutes to complete the written survey that you may receive in the mail after your visit with us. Thank you!             Your Updated Medication List - Protect others around you: Learn " how to safely use, store and throw away your medicines at www.disposemymeds.org.          This list is accurate as of 8/2/18  1:34 PM.  Always use your most recent med list.                   Brand Name Dispense Instructions for use Diagnosis    albuterol 108 (90 Base) MCG/ACT Inhaler    PROAIR HFA/PROVENTIL HFA/VENTOLIN HFA    3 Inhaler    Inhale 2 puffs into the lungs every 6 hours as needed for shortness of breath / dyspnea    Mild persistent asthma, uncomplicated       CALCIUM + D3 PO      Take 1 tablet by mouth daily        cetirizine 10 MG tablet    ZYRTEC ALLERGY    90 tablet    Take 1 tablet (10 mg) by mouth daily    Chronic rhinitis       desoximetasone 0.25 %    TOPICORT    3 Bottle    Apply topically 2 times daily    Foot dermatitis       drospirenone-ethinyl estradiol 3-0.03 MG per tablet    OCELLA    84 tablet    Take 1 tablet by mouth daily    Encounter for surveillance of contraceptive pills       FISH OIL BURP-LESS 1000 MG Caps      Take 1 capsule by mouth daily        FLUoxetine 10 MG capsule    PROzac    60 capsule    TAKE 1 CAPSULE (10 MG) BY MOUTH DAILY    CONSTANTINO (generalized anxiety disorder)       fluticasone-salmeterol 250-50 MCG/DOSE diskus inhaler    ADVAIR DISKUS    2 Inhaler    INHALE 1 PUFF INTO THE LUNGS EVERY 12 HOURS    Mild persistent asthma, uncomplicated       PROBIOTIC PO      Take 1 capsule by mouth daily        CATALINA-E PO      Take 1,500 mg by mouth daily

## 2018-08-02 NOTE — LETTER
"8/2/2018      Mora Chang PA-C  [address]  [facility] [address]      RE: Audra Almanzar       Dear Colleague,    I had the pleasure of seeing Audra Almanzar in the [facility].    Service Date: 08/02/2018      PRIMARY CARE AND REFERRING PROVIDER:  AMANDA Cobos       REASON FOR VISIT:  Palpitations.       HISTORY OF PRESENT ILLNESS:  Thank you for referring Audra Almanzar to [facility].  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  She is here for evaluation of palpitations.      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  They occur only at rest, last a few seconds.  She describes them as \"a fluttering I feel in my chest followed by a skipped beat.\"  Not associated with any other symptoms, never woke her up from sleep and do not occur during exercise.  She is a rare caffeine user, drinks 4-6 glasses of wine per week, no tobacco.  Does not do any targeted exercise but generally remains fairly active.  There is no family history of arrhythmias.      She has historically attributed these to the tremendous stress she was under when completing her associate nursing degree.  She has started her bachelor's degree and is trying to juggle finances.       She is on long-term fluoxetine.  I recently decreased the dose from 20 mg to 10 mg daily.      ECG done today shows normal sinus rhythm at 71 BPM with a MA of 126 milliseconds and QTc of 393 milliseconds.      Recent labs also satisfactory with a fantastic lipid profile (total cholesterol 138, HDL 73, LDL 48, triglycerides 87).  Normal electrolytes with a creatinine of 0.8.  TSH 1.62.  Hemoglobin 12.8.      CURRENT MEDICATIONS:     1.  Albuterol inhaler for mild " asthma.   2.  Calcium and vitamin D supplement.   3.  Cetirizine 10 mg daily.   4.  Ocella oral contraceptive pill (drospirenone/ethinyl estradiol).    5.  Fluoxetine 10 mg daily.   6.  Fluticasone inhaler.   7.  Over-the-counter omega-3 fatty acids.    8.  Probiotic.    9.  S-Adenosylmethionine.       ALLERGIES:  No known allergies.      REVIEW OF SYSTEMS:  A comprehensive review of systems was completed and the pertinent positives are documented in the HPI.      PHYSICAL EXAMINATION:   VITAL SIGNS:  Heart rate is 98 per minute and regular, blood pressure 122/82, height 1.702 meters (5 feet 7 inches), weight 75.2 kilograms (166 pounds), BMI 26 kg/m2.   GENERAL:  The patient's physical exam is entirely unremarkable.   CARDIOVASCULAR:  Has normal heart sounds, no murmur, no carotid bruit.   EXTREMITIES:  Good peripheral pulses.   ABDOMEN:  Soft and nontender.    NECK:  Thyroid exam normal.       DIAGNOSES:  Palpitations.      ASSESSMENT AND PLAN:  As the patient herself has identified, I suspect the palpitations are related to the stress she is under from doing her nursing degree.  The symptoms are very transitory and only occur when resting.  TSH is normal, resting ECG normal, physical exam normal.  To allay her fears, I will obtain a 7-day heart rhythm monitor (Zio patch) and heart ultrasound and echocardiogram.  We will follow up with her in clinic to review results.      Thank you for referring this young patient.  If you have any questions, please do not hesitate to contact me.        William Parikh MD       cc:   AMANDA Cobos    [address], [address]   [place] [address]         WILLIAM PARIKH MD             D: 2018   T: 2018   MT: MACY      Name:     EDGAR SINGH   MRN:      [id number]        Account:      [id number]   :      1990           Service Date: 2018      Document: [id number]         Outpatient Encounter  Prescriptions as of 8/2/2018   Medication Sig Dispense Refill     albuterol (PROAIR HFA/PROVENTIL HFA/VENTOLIN HFA) 108 (90 BASE) MCG/ACT Inhaler Inhale 2 puffs into the lungs every 6 hours as needed for shortness of breath / dyspnea 3 Inhaler 3     Calcium Carb-Cholecalciferol (CALCIUM + D3 PO) Take 1 tablet by mouth daily       cetirizine (ZYRTEC ALLERGY) 10 MG tablet Take 1 tablet (10 mg) by mouth daily 90 tablet 3     desoximetasone (TOPICORT) 0.25 % Apply topically 2 times daily 3 Bottle 3     drospirenone-ethinyl estradiol (OCELLA) 3-0.03 MG per tablet Take 1 tablet by mouth daily 84 tablet 4     FLUoxetine (PROZAC) 10 MG capsule TAKE 1 CAPSULE (10 MG) BY MOUTH DAILY 60 capsule 1     fluticasone-salmeterol (ADVAIR DISKUS) 250-50 MCG/DOSE diskus inhaler INHALE 1 PUFF INTO THE LUNGS EVERY 12 HOURS 2 Inhaler 0     Omega-3 Fatty Acids (FISH OIL BURP-LESS) 1000 MG CAPS Take 1 capsule by mouth daily       Probiotic Product (PROBIOTIC PO) Take 1 capsule by mouth daily       S-Adenosylmethionine (CATALINA-E PO) Take 1,500 mg by mouth daily       [DISCONTINUED] Biotin 5000 MCG CAPS Take  by mouth daily.       [DISCONTINUED] FLUoxetine (PROZAC) 20 MG capsule Take 1 capsule (20 mg) by mouth daily Annual exam needed before any further refills, call [phone] to schedule 90 capsule 3     No facility-administered encounter medications on file as of 8/2/2018.        Again, thank you for allowing me to participate in the care of your patient.      Sincerely,    Dawood Varner MD     [facility]

## 2018-08-13 DIAGNOSIS — F41.1 GAD (GENERALIZED ANXIETY DISORDER): ICD-10-CM

## 2018-08-13 NOTE — TELEPHONE ENCOUNTER
"Requested Prescriptions   Pending Prescriptions Disp Refills     FLUoxetine (PROZAC) 10 MG capsule [Pharmacy Med Name: FLUOXETINE HCL 10 MG CAPSULE]  Last Written Prescription Date:  7-  Last Fill Quantity: 60 capsule,  # refills: 1   Last office visit: 3/20/2018 with prescribing provider:     Future Office Visit:     60 capsule 1     Sig: TAKE 1 CAPSULE (10 MG) BY MOUTH DAILY    SSRIs Protocol Passed    8/13/2018  1:03 PM       Passed - Recent (12 mo) or future (30 days) visit within the authorizing provider's specialty    Patient had office visit in the last 12 months or has a visit in the next 30 days with authorizing provider or within the authorizing provider's specialty.  See \"Patient Info\" tab in inbasket, or \"Choose Columns\" in Meds & Orders section of the refill encounter.           Passed - Patient is age 18 or older       Passed - No active pregnancy on record       Passed - No positive pregnancy test in last 12 months          "

## 2018-08-14 RX ORDER — FLUOXETINE 10 MG/1
CAPSULE ORAL
Qty: 0.3 CAPSULE | Refills: 0 | OUTPATIENT
Start: 2018-08-14

## 2018-08-14 NOTE — TELEPHONE ENCOUNTER
Duplicate    Refused Prescriptions:                       Disp   Refills    FLUoxetine (PROZAC) 10 MG capsule [Pharmac*0.3 ca*0        Sig: TAKE 1 CAPSULE (10 MG) BY MOUTH DAILY  Refused By: TERRY BAILON  Reason for Refusal: Duplicate      Closing encounter - no further actions needed at this time    Terry Bailon RN

## 2018-08-27 ENCOUNTER — TELEPHONE (OUTPATIENT)
Dept: CARDIOLOGY | Facility: CLINIC | Age: 28
End: 2018-08-27

## 2018-08-27 NOTE — TELEPHONE ENCOUNTER
Called pt per Dr Varner's request to see how she is feeling and whether I can help her schedule a ziopatch 7 day monitor and a follow up OV.  Left message to call back

## 2018-12-27 DIAGNOSIS — F41.1 GAD (GENERALIZED ANXIETY DISORDER): ICD-10-CM

## 2018-12-27 NOTE — TELEPHONE ENCOUNTER
"Requested Prescriptions   Pending Prescriptions Disp Refills     FLUoxetine (PROZAC) 10 MG capsule [Pharmacy Med Name: FLUOXETINE HCL 10 MG CAPSULE]    Last Written Prescription Date:  7-31-18  Last Fill Quantity: 60,  # refills: 1   Last office visit: 3/20/2018 with prescribing provider:  Mora Chang   Future Office Visit:     60 capsule 1     Sig: TAKE 1 CAPSULE BY MOUTH EVERY DAY    SSRIs Protocol Passed - 12/27/2018  7:47 AM       Passed - Recent (12 mo) or future (30 days) visit within the authorizing provider's specialty    Patient had office visit in the last 12 months or has a visit in the next 30 days with authorizing provider or within the authorizing provider's specialty.  See \"Patient Info\" tab in inbasket, or \"Choose Columns\" in Meds & Orders section of the refill encounter.             Passed - Patient is age 18 or older       Passed - No active pregnancy on record       Passed - No positive pregnancy test in last 12 months          "

## 2018-12-28 RX ORDER — FLUOXETINE 10 MG/1
CAPSULE ORAL
Qty: 90 CAPSULE | Refills: 0 | Status: SHIPPED | OUTPATIENT
Start: 2018-12-28 | End: 2019-03-30

## 2018-12-28 NOTE — TELEPHONE ENCOUNTER
Prescription approved per [facility] [place] [facility].    LOV: 03/20/2018    Arlene Charlton, RN  Triage Nurse

## 2019-03-30 DIAGNOSIS — F41.1 GAD (GENERALIZED ANXIETY DISORDER): ICD-10-CM

## 2019-04-01 NOTE — TELEPHONE ENCOUNTER
"Requested Prescriptions   Pending Prescriptions Disp Refills     FLUoxetine (PROZAC) 10 MG capsule [Pharmacy Med Name: FLUOXETINE HCL 10 MG CAPSULE] 90 capsule 0    Last Written Prescription Date:  12/28/2018  Last Fill Quantity: 90,  # refills: 0   Last office visit: 3/20/2018 with prescribing provider:  03/20/2018   Future Office Visit:   Sig: TAKE 1 CAPSULE BY MOUTH EVERY DAY    SSRIs Protocol Failed - 4/1/2019  7:41 AM       Failed - Recent (12 mo) or future (30 days) visit within the authorizing provider's specialty    Patient had office visit in the last 12 months or has a visit in the next 30 days with authorizing provider or within the authorizing provider's specialty.  See \"Patient Info\" tab in inbasket, or \"Choose Columns\" in Meds & Orders section of the refill encounter.             Passed - Medication is active on med list       Passed - Patient is age 18 or older       Passed - No active pregnancy on record       Passed - No positive pregnancy test in last 12 months        "

## 2019-04-01 NOTE — TELEPHONE ENCOUNTER
Called patient, left voicemail to return call to clinic.    Due for office visit, LOV: 03/20/2018    Arlene Charlton, DARRICK  Triage Nurse

## 2019-04-02 RX ORDER — FLUOXETINE 10 MG/1
CAPSULE ORAL
Qty: 30 CAPSULE | Refills: 0 | Status: SHIPPED | OUTPATIENT
Start: 2019-04-02 | End: 2019-04-27

## 2019-04-02 NOTE — TELEPHONE ENCOUNTER
Patient called clinic and scheduled OV with Shayy for 04/10/19.    Medication is being filled for 1 time refill only due to:  Patient needs to be seen because due for physical.    Dipti Santamaria RN  [facility]

## 2019-05-28 DIAGNOSIS — Z30.41 ENCOUNTER FOR SURVEILLANCE OF CONTRACEPTIVE PILLS: ICD-10-CM

## 2019-05-28 RX ORDER — DROSPIRENONE AND ETHINYL ESTRADIOL 0.03MG-3MG
1 KIT ORAL DAILY
Qty: 28 TABLET | Refills: 0 | Status: SHIPPED | OUTPATIENT
Start: 2019-05-28 | End: 2019-06-05

## 2019-05-28 NOTE — TELEPHONE ENCOUNTER
"Requested Prescriptions   Pending Prescriptions Disp Refills     drospirenone-ethinyl estradiol (CLAUDIA) 3-0.03 MG tablet [Pharmacy Med Name: DROSPIRENONE-EE 3-0.03 MG TAB] 84 tablet 4     Sig: TAKE 1 TABLET BY MOUTH DAILY  Last Written Prescription Date:  3/20/18  Last Fill Quantity: 84,  # refills: 4   Last office visit: 3/20/2018 with prescribing provider:  Bernardo   Future Office Visit:           Contraceptives Protocol Failed - 5/28/2019  1:13 AM        Failed - Recent (12 mo) or future (30 days) visit within the authorizing provider's specialty     Patient had office visit in the last 12 months or has a visit in the next 30 days with authorizing provider or within the authorizing provider's specialty.  See \"Patient Info\" tab in inbasket, or \"Choose Columns\" in Meds & Orders section of the refill encounter.              Passed - Patient is not a current smoker if age is 35 or older        Passed - Medication is active on med list        Passed - No active pregnancy on record        Passed - No positive pregnancy test in past 12 months          "

## 2019-06-04 NOTE — PATIENT INSTRUCTIONS
B-complex-basic  Dereje. 1 cap daily  Preventive Health Recommendations  Female Ages 26 - 39  Yearly exam:   See your health care provider every year in order to    Review health changes.     Discuss preventive care.      Review your medicines if you your doctor has prescribed any.    Until age 30: Get a Pap test every three years (more often if you have had an abnormal result).    After age 30: Talk to your doctor about whether you should have a Pap test every 3 years or have a Pap test with HPV screening every 5 years.   You do not need a Pap test if your uterus was removed (hysterectomy) and you have not had cancer.  You should be tested each year for STDs (sexually transmitted diseases), if you're at risk.   Talk to your provider about how often to have your cholesterol checked.  If you are at risk for diabetes, you should have a diabetes test (fasting glucose).  Shots: Get a flu shot each year. Get a tetanus shot every 10 years.   Nutrition:     Eat at least 5 servings of fruits and vegetables each day.    Eat whole-grain bread, whole-wheat pasta and brown rice instead of white grains and rice.    Get adequate Calcium and Vitamin D.     Lifestyle    Exercise at least 150 minutes a week (30 minutes a day, 5 days of the week). This will help you control your weight and prevent disease.    Limit alcohol to one drink per day.    No smoking.     Wear sunscreen to prevent skin cancer.    See your dentist every six months for an exam and cleaning.

## 2019-06-04 NOTE — PROGRESS NOTES
SUBJECTIVE:   CC: Audra Almanzar is an 29 year old woman who presents for preventive health visit.     Healthy Habits:    Do you get at least three servings of calcium containing foods daily (dairy, green leafy vegetables, etc.)? yes    Amount of exercise or daily activities, outside of work: 3 day(s) per week    Problems taking medications regularly No    Medication side effects: No    Have you had an eye exam in the past two years? no    Do you see a dentist twice per year? yes    Do you have sleep apnea, excessive snoring or daytime drowsiness?no    No concerns  Mom was just diagnosed with breast cancer  She's just about to get BRCA gene testing        Today's PHQ-2 Score:   PHQ-2 (  [employer]) 2019 3/20/2018   Q1: Little interest or pleasure in doing things 0 0   Q2: Feeling down, depressed or hopeless 0 0   PHQ-2 Score 0 0       Abuse: Current or Past(Physical, Sexual or Emotional)- No  Do you feel safe in your environment? Yes    Social History     Tobacco Use     Smoking status: Former Smoker     Packs/day: 0.00     Types: Cigarettes     Last attempt to quit: 3/11/2014     Years since quittin.2     Smokeless tobacco: Never Used   Substance Use Topics     Alcohol use: Yes     Comment: 4 per week     If you drink alcohol do you typically have >3 drinks per day or >7 drinks per week? Yes - AUDIT SCORE:     No flowsheet data found.                     Reviewed orders with patient.  Reviewed health maintenance and updated orders accordingly - Yes  Lab work is in process  Labs reviewed in [facility]    Mammogram not appropriate for this patient based on age.    Pertinent mammograms are reviewed under the imaging tab.  History of abnormal Pap smear: NO - age 30- 65 PAP every 3 years recommended  PAP / HPV Latest Ref Rng & Units 3/20/2018 3/11/2015 3/5/2014   PAP - NIL NIL NIL   HPV 16 DNA NEG:Negative Negative Negative -   HPV 18 DNA NEG:Negative Negative Negative -   OTHER HR HPV NEG:Negative Negative  "Negative -     Reviewed and updated as needed this visit by clinical staff  Tobacco  Allergies  Meds         Reviewed and updated as needed this visit by Provider            ROS:  CONSTITUTIONAL: NEGATIVE for fever, chills, change in weight  INTEGUMENTARY/SKIN: NEGATIVE for worrisome rashes, moles or lesions  EYES: NEGATIVE for vision changes or irritation  ENT: NEGATIVE for ear, mouth and throat problems  RESP: NEGATIVE for significant cough or SOB  BREAST: NEGATIVE for masses, tenderness or discharge  CV: NEGATIVE for chest pain, palpitations or peripheral edema  GI: NEGATIVE for nausea, abdominal pain, heartburn, or change in bowel habits  : NEGATIVE for unusual urinary or vaginal symptoms. No vaginal bleeding.  MUSCULOSKELETAL: NEGATIVE for significant arthralgias or myalgia  NEURO: NEGATIVE for weakness, dizziness or paresthesias  ENDOCRINE: NEGATIVE for temperature intolerance, skin/hair changes  PSYCHIATRIC: NEGATIVE for changes in mood or affect     OBJECTIVE:   /80   Pulse 65   Temp 98.2  F (36.8  C) (Oral)   Ht 1.702 m (5' 7\")   Wt 74.8 kg (164 lb 14.4 oz)   LMP 05/28/2019   SpO2 99%   BMI 25.83 kg/m    EXAM:  GENERAL: healthy, alert and no distress  EYES: Eyes grossly normal to inspection, PERRL and conjunctivae and sclerae normal  HENT: ear canals and TM's normal, nose and mouth without ulcers or lesions  NECK: no adenopathy, no asymmetry, masses, or scars and thyroid normal to palpation  RESP: lungs clear to auscultation - no rales, rhonchi or wheezes  CV: regular rate and rhythm, normal S1 S2, no S3 or S4, no murmur, click or rub, no peripheral edema and peripheral pulses strong  ABDOMEN: soft, nontender, no hepatosplenomegaly, no masses and bowel sounds normal  MS: no gross musculoskeletal defects noted, no edema  SKIN: no suspicious lesions or rashes  NEURO: Normal strength and tone, mentation intact and speech normal  PSYCH: mentation appears normal, affect " "normal/bright        ASSESSMENT/PLAN:       ICD-10-CM    1. Encounter for routine adult medical exam with abnormal findings Z00.01    2. CONSTANTINO (generalized anxiety disorder) F41.1 FLUoxetine (PROZAC) 10 MG capsule   3. Mild persistent asthma, uncomplicated J45.30 fluticasone-salmeterol (ADVAIR DISKUS) 250-50 MCG/DOSE inhaler   4. Foot dermatitis L30.9 desoximetasone (TOPICORT) 0.25 %   5. Encounter for surveillance of contraceptive pills Z30.41 drospirenone-ethinyl estradiol (CLAUDIA) 3-0.03 MG tablet       COUNSELING:   Reviewed preventive health counseling, as reflected in patient instructions    Estimated body mass index is 25.83 kg/m  as calculated from the following:    Height as of this encounter: 1.702 m (5' 7\").    Weight as of this encounter: 74.8 kg (164 lb 14.4 oz).         reports that she quit smoking about 5 years ago. Her smoking use included cigarettes. She smoked 0.00 packs per day. She has never used smokeless tobacco.      Counseling Resources:  ATP IV Guidelines  Pooled Cohorts Equation Calculator  Breast Cancer Risk Calculator  FRAX Risk Assessment  ICSI Preventive Guidelines  Dietary Guidelines for Americans, 2010  USDA's MyPlate  ASA Prophylaxis  Lung CA Screening    Mora Chang PA-C  [facility]  "

## 2019-06-05 ENCOUNTER — TELEPHONE (OUTPATIENT)
Dept: FAMILY MEDICINE | Facility: CLINIC | Age: 29
End: 2019-06-05

## 2019-06-05 ENCOUNTER — OFFICE VISIT (OUTPATIENT)
Dept: FAMILY MEDICINE | Facility: CLINIC | Age: 29
End: 2019-06-05
Payer: COMMERCIAL

## 2019-06-05 VITALS
HEIGHT: 67 IN | DIASTOLIC BLOOD PRESSURE: 80 MMHG | SYSTOLIC BLOOD PRESSURE: 118 MMHG | HEART RATE: 65 BPM | WEIGHT: 164.9 LBS | TEMPERATURE: 98.2 F | OXYGEN SATURATION: 99 % | BODY MASS INDEX: 25.88 KG/M2

## 2019-06-05 DIAGNOSIS — Z30.41 ENCOUNTER FOR SURVEILLANCE OF CONTRACEPTIVE PILLS: ICD-10-CM

## 2019-06-05 DIAGNOSIS — F41.1 GAD (GENERALIZED ANXIETY DISORDER): ICD-10-CM

## 2019-06-05 DIAGNOSIS — L20.89 OTHER ATOPIC DERMATITIS: Primary | ICD-10-CM

## 2019-06-05 DIAGNOSIS — Z00.01 ENCOUNTER FOR ROUTINE ADULT MEDICAL EXAM WITH ABNORMAL FINDINGS: Primary | ICD-10-CM

## 2019-06-05 DIAGNOSIS — L30.9 FOOT DERMATITIS: ICD-10-CM

## 2019-06-05 DIAGNOSIS — J45.30 MILD PERSISTENT ASTHMA, UNCOMPLICATED: ICD-10-CM

## 2019-06-05 PROCEDURE — 99395 PREV VISIT EST AGE 18-39: CPT | Performed by: PHYSICIAN ASSISTANT

## 2019-06-05 RX ORDER — DESOXIMETASONE 2.5 MG/ML
SPRAY TOPICAL 2 TIMES DAILY
Qty: 3 BOTTLE | Refills: 3 | Status: SHIPPED | OUTPATIENT
Start: 2019-06-05

## 2019-06-05 RX ORDER — FLUOXETINE 10 MG/1
CAPSULE ORAL
Qty: 90 CAPSULE | Refills: 3 | Status: SHIPPED | OUTPATIENT
Start: 2019-06-05 | End: 2021-08-12

## 2019-06-05 RX ORDER — DROSPIRENONE AND ETHINYL ESTRADIOL 0.03MG-3MG
1 KIT ORAL DAILY
Qty: 84 TABLET | Refills: 3 | Status: SHIPPED | OUTPATIENT
Start: 2019-06-05 | End: 2020-04-24

## 2019-06-05 ASSESSMENT — ASTHMA QUESTIONNAIRES
QUESTION_5 LAST FOUR WEEKS HOW WOULD YOU RATE YOUR ASTHMA CONTROL: COMPLETELY CONTROLLED
QUESTION_1 LAST FOUR WEEKS HOW MUCH OF THE TIME DID YOUR ASTHMA KEEP YOU FROM GETTING AS MUCH DONE AT WORK, SCHOOL OR AT HOME: NONE OF THE TIME
QUESTION_2 LAST FOUR WEEKS HOW OFTEN HAVE YOU HAD SHORTNESS OF BREATH: NOT AT ALL
ACT_TOTALSCORE: 24
QUESTION_3 LAST FOUR WEEKS HOW OFTEN DID YOUR ASTHMA SYMPTOMS (WHEEZING, COUGHING, SHORTNESS OF BREATH, CHEST TIGHTNESS OR PAIN) WAKE YOU UP AT NIGHT OR EARLIER THAN USUAL IN THE MORNING: NOT AT ALL
QUESTION_4 LAST FOUR WEEKS HOW OFTEN HAVE YOU USED YOUR RESCUE INHALER OR NEBULIZER MEDICATION (SUCH AS ALBUTEROL): ONCE A WEEK OR LESS

## 2019-06-05 ASSESSMENT — MIFFLIN-ST. JEOR: SCORE: 1495.61

## 2019-06-05 NOTE — TELEPHONE ENCOUNTER
Prior Authorization Retail Medication Request    Medication/Dose:   ICD code (if different than what is on RX):    Previously Tried and Failed:    Rationale:      Insurance Name:  [employer]  Insurance ID:  TRX Code: [id number]      Pharmacy Information (if different than what is on RX)  Name:  [facility]   Phone:  [phone]  Fax: [phone]

## 2019-06-06 ASSESSMENT — ASTHMA QUESTIONNAIRES: ACT_TOTALSCORE: 24

## 2019-06-06 NOTE — TELEPHONE ENCOUNTER
Central Prior Authorization Team  Phone: [phone]    PA Initiation    Medication: desoximetasone (TOPICORT) 0.25 %  Insurance Company: [employer] - Phone [phone] Fax [phone]  Pharmacy Filling the Rx: [id number] [place] [address]  Filling Pharmacy Phone: [phone]  Filling Pharmacy Fax:    Start Date: 6/6/2019

## 2019-06-07 RX ORDER — BETAMETHASONE DIPROPIONATE 0.5 MG/ML
LOTION, AUGMENTED TOPICAL 2 TIMES DAILY
Qty: 60 ML | Refills: 0 | Status: SHIPPED | OUTPATIENT
Start: 2019-06-07 | End: 2019-06-14

## 2019-06-07 NOTE — TELEPHONE ENCOUNTER
Shayy/Provider Pool;   PA for desoximetasone (TOPICORT) 0.25 % has been denied    Denial Rationale:  Patient must have a history of trial & failure to the formulary alternative(s) or have a contraindication or intolerance to the formulary alternatives:  fluocinonide 0.05% solution and betamethasone dipropionate augmented 0.05% lotion.    Should alternative medication be ordered for patient?    Pharmacy cued    Please advise    Thank You!  Arlene Charlton, DARRICK  Triage Nurse

## 2019-06-07 NOTE — TELEPHONE ENCOUNTER
PRIOR AUTHORIZATION DENIED    Medication: desoximetasone (TOPICORT) 0.25 %- DENIED    Denial Date: 6/7/2019    Denial Rational: Pa was denied. Patient must have a history of trial & failure to the formulary alternative(s) or have a contraindication or intolerance to the formulary alternatives:  fluocinonide 0.05% solution and betamethasone dipropionate augmented 0.05% lotion.    Appeal Information: If provider would like to appeal, please provide a letter of medical necessity.

## 2019-06-07 NOTE — TELEPHONE ENCOUNTER
Try    Orders Placed This Encounter     augmented betamethasone dipropionate (DIPROLENE) 0.05 % external lotion     Sig: Apply topically 2 times daily for 7 days     Dispense:  60 mL     Refill:  0     ending PCP review

## 2019-07-01 NOTE — TELEPHONE ENCOUNTER
Called patient, informed patient that she is due for office visit. Patient verbalized understanding, appointment scheduled 06/05/2019.    Medication is being filled for 1 time refill only due to:  Patient needs to be seen because it has been more than one year since last visit.     Arlene Charlton RN  Triage Nurse   Noted.

## 2019-08-19 ENCOUNTER — OFFICE VISIT (OUTPATIENT)
Dept: FAMILY MEDICINE | Facility: CLINIC | Age: 29
End: 2019-08-19
Payer: COMMERCIAL

## 2019-08-19 VITALS
OXYGEN SATURATION: 98 % | HEART RATE: 66 BPM | WEIGHT: 163 LBS | BODY MASS INDEX: 25.53 KG/M2 | TEMPERATURE: 97.9 F | DIASTOLIC BLOOD PRESSURE: 72 MMHG | SYSTOLIC BLOOD PRESSURE: 100 MMHG | RESPIRATION RATE: 18 BRPM

## 2019-08-19 DIAGNOSIS — R05.9 COUGH: Primary | ICD-10-CM

## 2019-08-19 DIAGNOSIS — J40 BRONCHITIS: ICD-10-CM

## 2019-08-19 PROCEDURE — 99213 OFFICE O/P EST LOW 20 MIN: CPT | Performed by: PHYSICIAN ASSISTANT

## 2019-08-19 RX ORDER — AZITHROMYCIN 250 MG/1
TABLET, FILM COATED ORAL
Qty: 6 TABLET | Refills: 0 | Status: SHIPPED | OUTPATIENT
Start: 2019-08-19 | End: 2019-08-24

## 2019-08-19 ASSESSMENT — ANXIETY QUESTIONNAIRES
7. FEELING AFRAID AS IF SOMETHING AWFUL MIGHT HAPPEN: NOT AT ALL
5. BEING SO RESTLESS THAT IT IS HARD TO SIT STILL: NOT AT ALL
GAD7 TOTAL SCORE: 1
3. WORRYING TOO MUCH ABOUT DIFFERENT THINGS: NOT AT ALL
IF YOU CHECKED OFF ANY PROBLEMS ON THIS QUESTIONNAIRE, HOW DIFFICULT HAVE THESE PROBLEMS MADE IT FOR YOU TO DO YOUR WORK, TAKE CARE OF THINGS AT HOME, OR GET ALONG WITH OTHER PEOPLE: NOT DIFFICULT AT ALL
6. BECOMING EASILY ANNOYED OR IRRITABLE: NOT AT ALL
1. FEELING NERVOUS, ANXIOUS, OR ON EDGE: SEVERAL DAYS
2. NOT BEING ABLE TO STOP OR CONTROL WORRYING: NOT AT ALL

## 2019-08-19 ASSESSMENT — PATIENT HEALTH QUESTIONNAIRE - PHQ9
5. POOR APPETITE OR OVEREATING: NOT AT ALL
SUM OF ALL RESPONSES TO PHQ QUESTIONS 1-9: 1

## 2019-08-19 NOTE — PROGRESS NOTES
Subjective     Audra Almanzar is a 29 year old female who presents to clinic today for the following health issues:    HPI   RESPIRATORY SYMPTOMS      Duration: x1 month     Description  Wet cough in the morning and dry cough later in the day, congestion     Severity: mild    Accompanying signs and symptoms: None    History (predisposing factors):  Asthma, Patient's mother and sister had same symptoms and was prescribed antibiotics     Therapies tried and outcome:  Mucinex     Wet productive cough in the morning, during the day more of a dry cough.     Has history of asthma, has been taking albuterol more frequently since these symptoms started. No wheezing or shortness of breath.       Patient Active Problem List   Diagnosis     CARDIOVASCULAR SCREENING; LDL GOAL LESS THAN 130     Foot dermatitis     Mild persistent asthma     Dermatitis     Anxiety disorder     Mild persistent asthma, uncomplicated     Past Surgical History:   Procedure Laterality Date     ENT SURGERY         Social History     Tobacco Use     Smoking status: Former Smoker     Packs/day: 0.00     Types: Cigarettes     Last attempt to quit: 3/11/2014     Years since quittin.4     Smokeless tobacco: Never Used   Substance Use Topics     Alcohol use: Yes     Comment: 4 per week     Family History   Problem Relation Age of Onset     Gynecology Mother         abnormal cells on pap     Depression/Anxiety Mother      Asthma Father      Cancer Maternal Grandmother         bladder     Hypertension Maternal Grandmother      Pacemaker Maternal Grandfather      Chronic Obstructive Pulmonary Disease Maternal Grandfather      Heart Surgery Maternal Grandfather      Chronic Obstructive Pulmonary Disease Paternal Grandmother      Heart Failure Paternal Grandmother      Coronary Artery Disease Paternal Grandmother      Alcohol/Drug Brother          Current Outpatient Medications   Medication Sig Dispense Refill     albuterol (PROAIR HFA/PROVENTIL  HFA/VENTOLIN HFA) 108 (90 BASE) MCG/ACT Inhaler Inhale 2 puffs into the lungs every 6 hours as needed for shortness of breath / dyspnea 3 Inhaler 3     azithromycin (ZITHROMAX) 250 MG tablet Take 2 tablets (500 mg) by mouth daily for 1 day, THEN 1 tablet (250 mg) daily for 4 days. 6 tablet 0     Calcium Carb-Cholecalciferol (CALCIUM + D3 PO) Take 1 tablet by mouth daily       cetirizine (ZYRTEC ALLERGY) 10 MG tablet Take 1 tablet (10 mg) by mouth daily 90 tablet 3     desoximetasone (TOPICORT) 0.25 % Apply topically 2 times daily 3 Bottle 3     drospirenone-ethinyl estradiol (CLAUDIA) 3-0.03 MG tablet Take 1 tablet by mouth daily 84 tablet 3     FLUoxetine (PROZAC) 10 MG capsule TAKE 1 CAPSULE BY MOUTH EVERY DAY 90 capsule 3     fluticasone-salmeterol (ADVAIR DISKUS) 250-50 MCG/DOSE inhaler INHALE 1 PUFF INTO THE LUNGS EVERY 12 HOURS 3 Inhaler 6     Omega-3 Fatty Acids (FISH OIL BURP-LESS) 1000 MG CAPS Take 1 capsule by mouth daily       Probiotic Product (PROBIOTIC PO) Take 1 capsule by mouth daily       No Known Allergies    Reviewed and updated as needed this visit by Provider         Review of Systems    ROS: 10 point ROS neg other than the symptoms noted above in the HPI.         Objective    /72 (BP Location: Left arm, Patient Position: Sitting, Cuff Size: Adult Regular)   Pulse 66   Temp 97.9  F (36.6  C) (Oral)   Resp 18   Wt 73.9 kg (163 lb)   SpO2 98%   BMI 25.53 kg/m    Body mass index is 25.53 kg/m .  Physical Exam   GENERAL: healthy, alert and no distress  EYES: Eyes grossly normal to inspection, PERRL and conjunctivae and sclerae normal  HENT: ear canals and TM's normal, nose and mouth without ulcers or lesions  NECK: no adenopathy, no asymmetry, masses, or scars and thyroid normal to palpation  RESP: lungs clear to auscultation - no rales, rhonchi or wheezes  CV: regular rate and rhythm, normal S1 S2, no S3 or S4, no murmur, click or rub, no peripheral edema and peripheral pulses  strong  MS: no gross musculoskeletal defects noted, no edema  SKIN: no suspicious lesions or rashes  NEURO: Normal strength and tone, mentation intact and speech normal  PSYCH: mentation appears normal, affect normal/bright    Diagnostic Test Results:  none         Assessment & Plan     1. Cough  - 1 month history of cough not improving with symptomatic cares  - Ill contacts with similar symptoms   - Will push fluids and take tylenol/ibuprofen as needed, mucinex as needed  - azithromycin (ZITHROMAX) 250 MG tablet; Take 2 tablets (500 mg) by mouth daily for 1 day, THEN 1 tablet (250 mg) daily for 4 days.  Dispense: 6 tablet; Refill: 0    2. Bronchitis  - 1 month history of cough not improving with symptomatic cares  - Ill contacts with similar symptoms   - Will push fluids and take tylenol/ibuprofen as needed, mucinex as needed  - History of asthma, no wheezing appreciated on exam   - azithromycin (ZITHROMAX) 250 MG tablet; Take 2 tablets (500 mg) by mouth daily for 1 day, THEN 1 tablet (250 mg) daily for 4 days.  Dispense: 6 tablet; Refill: 0    Patient Instructions     Patient Education     Bronchitis, Antibiotic Treatment (Adult)    Bronchitis is an infection of the air passages (bronchial tubes) in your lungs. It often occurs when you have a cold. This illness is contagious during the first few days and is spread through the air by coughing and sneezing, or by direct contact (touching the sick person and then touching your own eyes, nose, or mouth).  Symptoms of bronchitis include cough with mucus (phlegm) and low-grade fever. Bronchitis usually lasts 7 to 14 days. Mild cases can be treated with simple home remedies. More severe infection is treated with an antibiotic.  Home care  Follow these guidelines when caring for yourself at home:    If your symptoms are severe, rest at home for the first 2 to 3 days. When you go back to your usual activities, don't let yourself get too tired.    Don't smoke. Also stay  away from secondhand smoke.    You may use over-the-counter medicines to control fever or pain, unless another medicine was prescribed. If you have chronic liver or kidney disease or have ever had a stomach ulcer or gastrointestinal bleeding, talk with your healthcare provider before using these medicines. Also talk to your provider if you are taking medicine to prevent blood clots. Aspirin should never be given to anyone younger than 18 who is ill with a viral infection or fever. It may cause severe liver or brain damage.    Your appetite may be low, so a light diet is fine. Stay well hydrated by drinking 6 to 8 glasses of fluids per day. This includes water, soft drinks, sports drinks, juices, tea, or soup. Extra fluids will help loosen mucus in your nose and lungs.    Over-the-counter cough, cold, and sore-throat medicines will not shorten the length of the illness, but they may be helpful to reduce your symptoms. Don't use decongestants if you have high blood pressure.    Finish all antibiotic medicine. Do this even if you are feeling better after only a few days.  Follow-up care  Follow up with your healthcare provider, or as advised. If you had an X-ray or ECG (electrocardiogram), a specialist will review it. You will be told of any new test results that may affect your care.  If you are age 65 or older, if you smoke, or if you have a chronic lung disease or condition that affects your immune system, ask your healthcare provider about getting a pneumococcal vaccine and a yearly flu shot (influenza vaccine).  When to seek medical advice  Call your healthcare provider right away if any of these occur:    Fever of 100.4 F (38 C) or higher, or as directed by your healthcare provider    Coughing up more sputum    Weakness, drowsiness, headache, facial pain, ear pain, or a stiff neck  Call 911  Call 911 if any of these occur.    Coughing up blood    Weakness, drowsiness, headache, or stiff neck that get  worse    Trouble breathing, wheezing, or pain with breathing  Date Last Reviewed: 6/1/2018 2000-2018 [employer]. [address] [place] [address], [place] [address]. All rights reserved. This information is not intended as a substitute for professional medical care. Always follow your healthcare professional's instructions.               Return if symptoms worsen or fail to improve.    Noble Garrison PA-C  [facility]

## 2019-08-19 NOTE — PATIENT INSTRUCTIONS

## 2019-08-20 ASSESSMENT — ANXIETY QUESTIONNAIRES: GAD7 TOTAL SCORE: 1

## 2019-09-06 ENCOUNTER — TELEPHONE (OUTPATIENT)
Dept: FAMILY MEDICINE | Facility: CLINIC | Age: 29
End: 2019-09-06

## 2019-09-06 DIAGNOSIS — L30.9 FOOT DERMATITIS: Primary | ICD-10-CM

## 2019-09-06 RX ORDER — DESOXIMETASONE 2.5 MG/G
CREAM TOPICAL 2 TIMES DAILY
Qty: 60 G | Refills: 0 | Status: SHIPPED | OUTPATIENT
Start: 2019-09-06 | End: 2019-09-20

## 2019-09-06 NOTE — TELEPHONE ENCOUNTER
Pt is requesting changing her prescription for desoximetasone (TOPICORT) from a spray to a cream for insurance coverage.    Pt's pharmacy queued.

## 2019-09-06 NOTE — TELEPHONE ENCOUNTER
Route to provider woody Lucas    See pt request for change in med    Med cued    Malissa Martin RN   [facility]

## 2019-11-07 ENCOUNTER — HEALTH MAINTENANCE LETTER (OUTPATIENT)
Age: 29
End: 2019-11-07

## 2020-03-26 ENCOUNTER — MYC MEDICAL ADVICE (OUTPATIENT)
Dept: FAMILY MEDICINE | Facility: CLINIC | Age: 30
End: 2020-03-26

## 2020-03-26 NOTE — LETTER
[address] [address]  Phone: [phone]  Fax: [phone]    March 26, 2020        Audra Almanzar  [address] [address]          To whom it may concern:    RE: Audra Zhu will need to refrain from seeing patients with COVID as she has asthma putting her at great risk for complications.     Please contact me for questions or concerns.      Sincerely,        Mora Chang PA-C

## 2020-03-27 ENCOUNTER — MYC MEDICAL ADVICE (OUTPATIENT)
Dept: FAMILY MEDICINE | Facility: CLINIC | Age: 30
End: 2020-03-27

## 2020-03-27 NOTE — TELEPHONE ENCOUNTER
Shayy    [facility]    Pharm cued    Malissa Martin, RN   [facility]

## 2020-04-23 DIAGNOSIS — Z30.41 ENCOUNTER FOR SURVEILLANCE OF CONTRACEPTIVE PILLS: ICD-10-CM

## 2020-04-23 RX ORDER — DROSPIRENONE AND ETHINYL ESTRADIOL 0.03MG-3MG
KIT ORAL
Qty: 84 TABLET | Refills: 3 | OUTPATIENT
Start: 2020-04-23

## 2020-04-24 ENCOUNTER — TELEPHONE (OUTPATIENT)
Dept: FAMILY MEDICINE | Facility: CLINIC | Age: 30
End: 2020-04-24

## 2020-04-24 ENCOUNTER — MYC MEDICAL ADVICE (OUTPATIENT)
Dept: FAMILY MEDICINE | Facility: CLINIC | Age: 30
End: 2020-04-24

## 2020-04-24 DIAGNOSIS — Z30.41 ENCOUNTER FOR SURVEILLANCE OF CONTRACEPTIVE PILLS: ICD-10-CM

## 2020-04-24 DIAGNOSIS — J45.30 MILD PERSISTENT ASTHMA, UNCOMPLICATED: ICD-10-CM

## 2020-04-24 RX ORDER — DROSPIRENONE AND ETHINYL ESTRADIOL 0.03MG-3MG
1 KIT ORAL DAILY
Qty: 84 TABLET | Refills: 0 | Status: SHIPPED | OUTPATIENT
Start: 2020-04-24 | End: 2020-07-23

## 2020-04-24 NOTE — TELEPHONE ENCOUNTER
Requested Prescriptions   Pending Prescriptions Disp Refills     drospirenone-ethinyl estradiol (CLAUDIA) 3-0.03 MG tablet 84 tablet 3     Sig: Take 1 tablet by mouth daily   Last Written Prescription Date:  6/5/19  Last Fill Quantity: 84,  # refills: 3   Last office visit: 8/19/2019 with prescribing provider:     Future Office Visit:        There is no refill protocol information for this order      Refill sent    [address] [address]  [phone]    Malissa Martin RN   [facility]

## 2020-04-24 NOTE — TELEPHONE ENCOUNTER
Reason for call:  Other   Patient called regarding (reason for call): call back  Additional comments: Patient called stated she spoke with a nurse and asked the nurse to change her pharmacy to the [facility] pharmacy in [place] on [facility]. Patient stated this was not done, advised patient that  could not change the pharmacy unless we was submitting a request.  Patient stated a nurse told her it could be change. Patient would like for the pharmacy to be changed on her file. Patient would like a call back regarding this    Phone number to reach patient:  Cell number on file:    Telephone Information:   Mobile [phone]       Best Time:  N/A    Can we leave a detailed message on this number?  YES    Travel screening: Not Applicable

## 2020-04-24 NOTE — TELEPHONE ENCOUNTER
[facility]  This was done    Malissa Martin RN   [facility]

## 2020-07-15 DIAGNOSIS — Z30.41 ENCOUNTER FOR SURVEILLANCE OF CONTRACEPTIVE PILLS: ICD-10-CM

## 2020-07-16 ENCOUNTER — MYC REFILL (OUTPATIENT)
Dept: FAMILY MEDICINE | Facility: CLINIC | Age: 30
End: 2020-07-16

## 2020-07-16 DIAGNOSIS — Z30.41 ENCOUNTER FOR SURVEILLANCE OF CONTRACEPTIVE PILLS: ICD-10-CM

## 2020-07-16 RX ORDER — DROSPIRENONE AND ETHINYL ESTRADIOL 0.03MG-3MG
1 KIT ORAL DAILY
Qty: 84 TABLET | Refills: 0 | Status: CANCELLED | OUTPATIENT
Start: 2020-07-16

## 2020-07-17 ENCOUNTER — MYC MEDICAL ADVICE (OUTPATIENT)
Dept: FAMILY MEDICINE | Facility: CLINIC | Age: 30
End: 2020-07-17

## 2020-07-17 DIAGNOSIS — Z30.41 ENCOUNTER FOR SURVEILLANCE OF CONTRACEPTIVE PILLS: ICD-10-CM

## 2020-07-17 NOTE — TELEPHONE ENCOUNTER
Appointment needed for patient. (Due for follow-up after 8/19, route back to triage for ne refill)    Please call patient 2 times in attempt to schedule an appointment for ASAP.    If appointment scheduled, please check with patient to see if they have enough medication to get them to appointment.  Please route back to triage with the above information.    If unable to get a hold of patient, please route to the provider.

## 2020-07-22 NOTE — TELEPHONE ENCOUNTER
Pt called.  She states that she was told by her provider Mora Chang that she don't need a f/u.    Can reach out to pt at [phone]

## 2020-07-22 NOTE — TELEPHONE ENCOUNTER
Left vm for patient to return call to clinic. No message per provider that patient does not need appointment. Per refill protocols patient needs a physical scheduled in order to receive refills. If she calls back please schedule for a physical, and we can refill medication if schedules.    Yumi Sierra RN   [facility]

## 2020-07-23 RX ORDER — DROSPIRENONE AND ETHINYL ESTRADIOL 0.03MG-3MG
1 KIT ORAL DAILY
Qty: 84 TABLET | Refills: 0 | Status: SHIPPED | OUTPATIENT
Start: 2020-07-23 | End: 2020-10-06

## 2020-07-30 RX ORDER — DROSPIRENONE AND ETHINYL ESTRADIOL 0.03MG-3MG
1 KIT ORAL DAILY
Qty: 84 TABLET | Refills: 0 | OUTPATIENT
Start: 2020-07-30

## 2020-08-25 NOTE — PATIENT INSTRUCTIONS
Fertility awareness method  Start prenatal vitamin 3-6 months with folate        Preventive Health Recommendations  Female Ages 26 - 39  Yearly exam:   See your health care provider every year in order to    Review health changes.     Discuss preventive care.      Review your medicines if you your doctor has prescribed any.    Until age 30: Get a Pap test every three years (more often if you have had an abnormal result).    After age 30: Talk to your doctor about whether you should have a Pap test every 3 years or have a Pap test with HPV screening every 5 years.   You do not need a Pap test if your uterus was removed (hysterectomy) and you have not had cancer.  You should be tested each year for STDs (sexually transmitted diseases), if you're at risk.   Talk to your provider about how often to have your cholesterol checked.  If you are at risk for diabetes, you should have a diabetes test (fasting glucose).  Shots: Get a flu shot each year. Get a tetanus shot every 10 years.   Nutrition:     Eat at least 5 servings of fruits and vegetables each day.    Eat whole-grain bread, whole-wheat pasta and brown rice instead of white grains and rice.    Get adequate Calcium and Vitamin D.     Lifestyle    Exercise at least 150 minutes a week (30 minutes a day, 5 days of the week). This will help you control your weight and prevent disease.    Limit alcohol to one drink per day.    No smoking.     Wear sunscreen to prevent skin cancer.    See your dentist every six months for an exam and cleaning.

## 2020-08-25 NOTE — PROGRESS NOTES
SUBJECTIVE:   CC: Audra Almanzar is an 30 year old woman who presents for preventive health visit.     Healthy Habits:    Do you get at least three servings of calcium containing foods daily (dairy, green leafy vegetables, etc.)? yes    Amount of exercise or daily activities, outside of work: 3 day(s) per week    Problems taking medications regularly No    Medication side effects: No    Have you had an eye exam in the past two years? no    Do you see a dentist twice per year? yes    Do you have sleep apnea, excessive snoring or daytime drowsiness?no    Getting  in October  Moving to [facility]    Asthma is stable  Mood is overall good, a little stressed but able to manage    Thinking about getting pregnant sometime about 6 months after getting   Curious about when to stop birth control  Periods were normal before starting it    Today's PHQ-2 Score:   PHQ-2 (  [employer]) 2020   Q1: Little interest or pleasure in doing things 0 0   Q2: Feeling down, depressed or hopeless 0 0   PHQ-2 Score 0 0       Abuse: Current or Past(Physical, Sexual or Emotional)- No  Do you feel safe in your environment? Yes        Social History     Tobacco Use     Smoking status: Former Smoker     Packs/day: 0.00     Types: Cigarettes     Last attempt to quit: 3/11/2014     Years since quittin.4     Smokeless tobacco: Never Used   Substance Use Topics     Alcohol use: Yes     Comment: 4 per week     If you drink alcohol do you typically have >3 drinks per day or >7 drinks per week? No                     Reviewed orders with patient.  Reviewed health maintenance and updated orders accordingly - Yes  Lab work is in process  Labs reviewed in EPIC  BP Readings from Last 3 Encounters:   20 124/62   19 100/72   19 118/80    Wt Readings from Last 3 Encounters:   20 70.1 kg (154 lb 8 oz)   19 73.9 kg (163 lb)   19 74.8 kg (164 lb 14.4 oz)                  Patient  Active Problem List   Diagnosis     CARDIOVASCULAR SCREENING; LDL GOAL LESS THAN 130     Foot dermatitis     Mild persistent asthma     Dermatitis     Anxiety disorder     Mild persistent asthma, uncomplicated     Past Surgical History:   Procedure Laterality Date     ENT SURGERY         Social History     Tobacco Use     Smoking status: Former Smoker     Packs/day: 0.00     Types: Cigarettes     Last attempt to quit: 3/11/2014     Years since quittin.4     Smokeless tobacco: Never Used   Substance Use Topics     Alcohol use: Yes     Comment: 4 per week     Family History   Problem Relation Age of Onset     Gynecology Mother         abnormal cells on pap     Depression/Anxiety Mother      Asthma Father      Cancer Maternal Grandmother         bladder     Hypertension Maternal Grandmother      Pacemaker Maternal Grandfather      Chronic Obstructive Pulmonary Disease Maternal Grandfather      Heart Surgery Maternal Grandfather      Chronic Obstructive Pulmonary Disease Paternal Grandmother      Heart Failure Paternal Grandmother      Coronary Artery Disease Paternal Grandmother      Alcohol/Drug Brother          Current Outpatient Medications   Medication Sig Dispense Refill     albuterol (PROAIR HFA/PROVENTIL HFA/VENTOLIN HFA) 108 (90 Base) MCG/ACT inhaler Inhale 2 puffs into the lungs every 6 hours as needed for shortness of breath / dyspnea 3 Inhaler 3     Calcium Carb-Cholecalciferol (CALCIUM + D3 PO) Take 1 tablet by mouth daily       cetirizine (ZYRTEC ALLERGY) 10 MG tablet Take 1 tablet (10 mg) by mouth daily 90 tablet 3     desoximetasone (TOPICORT) 0.25 % Apply topically 2 times daily 3 Bottle 3     drospirenone-ethinyl estradiol (CLAUDIA) 3-0.03 MG tablet Take 1 tablet by mouth daily 84 tablet 0     FLUoxetine (PROZAC) 10 MG capsule TAKE 1 CAPSULE BY MOUTH EVERY DAY 90 capsule 3     fluticasone-salmeterol (ADVAIR DISKUS) 250-50 MCG/DOSE inhaler INHALE 1 PUFF INTO THE LUNGS EVERY 12 HOURS 3 Inhaler 0      "mometasone-formoterol (DULERA) 200-5 MCG/ACT inhaler Inhale 2 puffs into the lungs 2 times daily 1 Inhaler 11     Omega-3 Fatty Acids (FISH OIL BURP-LESS) 1000 MG CAPS Take 1 capsule by mouth daily       Probiotic Product (PROBIOTIC PO) Take 1 capsule by mouth daily         Mammogram not appropriate for this patient based on age.    Pertinent mammograms are reviewed under the imaging tab.  History of abnormal Pap smear: NO - age 30- 65 PAP every 3 years recommended  PAP / HPV Latest Ref Rng & Units 3/20/2018 3/11/2015 3/5/2014   PAP - NIL NIL NIL   HPV 16 DNA NEG:Negative Negative Negative -   HPV 18 DNA NEG:Negative Negative Negative -   OTHER HR HPV NEG:Negative Negative Negative -     Reviewed and updated as needed this visit by clinical staff  Tobacco  Allergies  Meds  Med Hx  Surg Hx  Fam Hx  Soc Hx        Reviewed and updated as needed this visit by Provider            ROS:  CONSTITUTIONAL: NEGATIVE for fever, chills, change in weight  INTEGUMENTARU/SKIN: NEGATIVE for worrisome rashes, moles or lesions  EYES: NEGATIVE for vision changes or irritation  ENT: NEGATIVE for ear, mouth and throat problems  RESP: NEGATIVE for significant cough or SOB  BREAST: NEGATIVE for masses, tenderness or discharge  CV: NEGATIVE for chest pain, palpitations or peripheral edema  GI: NEGATIVE for nausea, abdominal pain, heartburn, or change in bowel habits  : NEGATIVE for unusual urinary or vaginal symptoms. Periods are regular.  MUSCULOSKELETAL: NEGATIVE for significant arthralgias or myalgia  NEURO: NEGATIVE for weakness, dizziness or paresthesias  PSYCHIATRIC: NEGATIVE for changes in mood or affect    OBJECTIVE:   /62   Pulse 63   Temp 97.7  F (36.5  C) (Temporal)   Resp 16   Ht 1.702 m (5' 7.01\")   Wt 70.1 kg (154 lb 8 oz)   SpO2 99%   BMI 24.19 kg/m    EXAM:  GENERAL: healthy, alert and no distress  EYES: Eyes grossly normal to inspection, PERRL and conjunctivae and sclerae normal  HENT: ear canals and " "TM's normal, nose and mouth without ulcers or lesions  NECK: no adenopathy, no asymmetry, masses, or scars and thyroid normal to palpation  RESP: lungs clear to auscultation - no rales, rhonchi or wheezes  CV: regular rate and rhythm, normal S1 S2, no S3 or S4, no murmur, click or rub, no peripheral edema and peripheral pulses strong  ABDOMEN: soft, nontender, no hepatosplenomegaly, no masses and bowel sounds normal  MS: no gross musculoskeletal defects noted, no edema  SKIN: no suspicious lesions or rashes  NEURO: Normal strength and tone, mentation intact and speech normal  PSYCH: mentation appears normal, affect normal/bright    Diagnostic Test Results:  Labs reviewed in Epic    ASSESSMENT/PLAN:       ICD-10-CM    1. Routine general medical examination at a health care facility  Z00.00    2. Mild persistent asthma, uncomplicated  J45.30 albuterol (PROAIR HFA/PROVENTIL HFA/VENTOLIN HFA) 108 (90 Base) MCG/ACT inhaler     mometasone-formoterol (DULERA) 200-5 MCG/ACT inhaler   3. Encounter for fertility planning  Z31.89        COUNSELING:   Reviewed preventive health counseling, as reflected in patient instructions    Estimated body mass index is 24.19 kg/m  as calculated from the following:    Height as of this encounter: 1.702 m (5' 7.01\").    Weight as of this encounter: 70.1 kg (154 lb 8 oz).         reports that she quit smoking about 6 years ago. Her smoking use included cigarettes. She smoked 0.00 packs per day. She has never used smokeless tobacco.      Counseling Resources:  ATP IV Guidelines  Pooled Cohorts Equation Calculator  Breast Cancer Risk Calculator  FRAX Risk Assessment  ICSI Preventive Guidelines  Dietary Guidelines for Americans, 2010  USDA's MyPlate  ASA Prophylaxis  Lung CA Screening    Mora Chang PA-C  [facility]  "

## 2020-08-26 ENCOUNTER — OFFICE VISIT (OUTPATIENT)
Dept: FAMILY MEDICINE | Facility: CLINIC | Age: 30
End: 2020-08-26
Payer: COMMERCIAL

## 2020-08-26 VITALS
BODY MASS INDEX: 24.25 KG/M2 | OXYGEN SATURATION: 99 % | HEIGHT: 67 IN | WEIGHT: 154.5 LBS | DIASTOLIC BLOOD PRESSURE: 62 MMHG | TEMPERATURE: 97.7 F | SYSTOLIC BLOOD PRESSURE: 124 MMHG | RESPIRATION RATE: 16 BRPM | HEART RATE: 63 BPM

## 2020-08-26 DIAGNOSIS — Z00.00 ROUTINE GENERAL MEDICAL EXAMINATION AT A HEALTH CARE FACILITY: Primary | ICD-10-CM

## 2020-08-26 DIAGNOSIS — Z31.89 ENCOUNTER FOR FERTILITY PLANNING: ICD-10-CM

## 2020-08-26 DIAGNOSIS — J45.30 MILD PERSISTENT ASTHMA, UNCOMPLICATED: ICD-10-CM

## 2020-08-26 PROCEDURE — 99395 PREV VISIT EST AGE 18-39: CPT | Performed by: PHYSICIAN ASSISTANT

## 2020-08-26 PROCEDURE — 99213 OFFICE O/P EST LOW 20 MIN: CPT | Mod: 25 | Performed by: PHYSICIAN ASSISTANT

## 2020-08-26 RX ORDER — ALBUTEROL SULFATE 90 UG/1
2 AEROSOL, METERED RESPIRATORY (INHALATION) EVERY 6 HOURS PRN
Qty: 3 INHALER | Refills: 3 | Status: SHIPPED | OUTPATIENT
Start: 2020-08-26

## 2020-08-26 ASSESSMENT — ASTHMA QUESTIONNAIRES
QUESTION_5 LAST FOUR WEEKS HOW WOULD YOU RATE YOUR ASTHMA CONTROL: COMPLETELY CONTROLLED
QUESTION_3 LAST FOUR WEEKS HOW OFTEN DID YOUR ASTHMA SYMPTOMS (WHEEZING, COUGHING, SHORTNESS OF BREATH, CHEST TIGHTNESS OR PAIN) WAKE YOU UP AT NIGHT OR EARLIER THAN USUAL IN THE MORNING: NOT AT ALL
ACT_TOTALSCORE: 25
QUESTION_1 LAST FOUR WEEKS HOW MUCH OF THE TIME DID YOUR ASTHMA KEEP YOU FROM GETTING AS MUCH DONE AT WORK, SCHOOL OR AT HOME: NONE OF THE TIME
QUESTION_2 LAST FOUR WEEKS HOW OFTEN HAVE YOU HAD SHORTNESS OF BREATH: NOT AT ALL
QUESTION_4 LAST FOUR WEEKS HOW OFTEN HAVE YOU USED YOUR RESCUE INHALER OR NEBULIZER MEDICATION (SUCH AS ALBUTEROL): NOT AT ALL

## 2020-08-26 ASSESSMENT — MIFFLIN-ST. JEOR: SCORE: 1443.56

## 2020-08-27 ASSESSMENT — ASTHMA QUESTIONNAIRES: ACT_TOTALSCORE: 25

## 2020-10-05 DIAGNOSIS — Z30.41 ENCOUNTER FOR SURVEILLANCE OF CONTRACEPTIVE PILLS: ICD-10-CM

## 2020-10-06 RX ORDER — DROSPIRENONE AND ETHINYL ESTRADIOL 0.03MG-3MG
1 KIT ORAL DAILY
Qty: 84 TABLET | Refills: 3 | Status: SHIPPED | OUTPATIENT
Start: 2020-10-06

## 2020-10-07 NOTE — TELEPHONE ENCOUNTER
"Requested Prescriptions   Pending Prescriptions Disp Refills     drospirenone-ethinyl estradiol (CLAUDIA) 3-0.03 MG tablet [Pharmacy Med Name: DROSPIRENONE-EE 3-0.03 MG TAB] 84 tablet 0     Sig: TAKE 1 TABLET BY MOUTH DAILY.       Contraceptives Protocol Passed - 10/6/2020  6:53 AM        Passed - Patient is not a current smoker if age is 35 or older        Passed - Recent (12 mo) or future (30 days) visit within the authorizing provider's specialty     Patient has had an office visit with the authorizing provider or a provider within the authorizing providers department within the previous 12 mos or has a future within next 30 days. See \"Patient Info\" tab in inbasket, or \"Choose Columns\" in Meds & Orders section of the refill encounter.              Passed - Medication is active on med list        Passed - No active pregnancy on record        Passed - No positive pregnancy test in past 12 months           Signed Prescriptions:                        Disp   Refills    drospirenone-ethinyl estradiol (CLAUDIA) 3-*84 tab*3        Sig: TAKE 1 TABLET BY MOUTH DAILY.  Authorizing Provider: FROILAN SENIOR  Ordering User: TERRY BAILON      "

## 2020-11-29 ENCOUNTER — HEALTH MAINTENANCE LETTER (OUTPATIENT)
Age: 30
End: 2020-11-29

## 2021-01-18 ENCOUNTER — TRANSFERRED RECORDS (OUTPATIENT)
Dept: HEALTH INFORMATION MANAGEMENT | Facility: CLINIC | Age: 31
End: 2021-01-18

## 2021-06-08 ENCOUNTER — TELEPHONE (OUTPATIENT)
Dept: FAMILY MEDICINE | Facility: CLINIC | Age: 31
End: 2021-06-08

## 2021-06-08 DIAGNOSIS — J45.30 MILD PERSISTENT ASTHMA, UNCOMPLICATED: Primary | ICD-10-CM

## 2021-06-08 RX ORDER — FLUTICASONE PROPIONATE AND SALMETEROL 232; 14 UG/1; UG/1
1 POWDER, METERED RESPIRATORY (INHALATION) 2 TIMES DAILY
Qty: 1 EACH | Refills: 6 | Status: SHIPPED | OUTPATIENT
Start: 2021-06-08 | End: 2021-08-12

## 2021-06-08 NOTE — TELEPHONE ENCOUNTER
Shayy Mendez from pharm, [id number] is not covered by insurance  [employer] is covered    Pharm cued    Malissa Martin RN   [facility]

## 2021-08-12 ENCOUNTER — OFFICE VISIT (OUTPATIENT)
Dept: FAMILY MEDICINE | Facility: CLINIC | Age: 31
End: 2021-08-12
Payer: COMMERCIAL

## 2021-08-12 ENCOUNTER — LAB (OUTPATIENT)
Dept: LAB | Facility: CLINIC | Age: 31
End: 2021-08-12

## 2021-08-12 VITALS
DIASTOLIC BLOOD PRESSURE: 70 MMHG | TEMPERATURE: 97.2 F | HEIGHT: 67 IN | BODY MASS INDEX: 26.06 KG/M2 | OXYGEN SATURATION: 100 % | WEIGHT: 166 LBS | HEART RATE: 78 BPM | SYSTOLIC BLOOD PRESSURE: 122 MMHG

## 2021-08-12 DIAGNOSIS — N92.6 IRREGULAR PERIODS: ICD-10-CM

## 2021-08-12 DIAGNOSIS — N89.8 VAGINAL DISCHARGE: ICD-10-CM

## 2021-08-12 DIAGNOSIS — Z12.4 SCREENING FOR MALIGNANT NEOPLASM OF CERVIX: ICD-10-CM

## 2021-08-12 DIAGNOSIS — F41.1 GAD (GENERALIZED ANXIETY DISORDER): ICD-10-CM

## 2021-08-12 DIAGNOSIS — Z00.00 ROUTINE GENERAL MEDICAL EXAMINATION AT A HEALTH CARE FACILITY: Primary | ICD-10-CM

## 2021-08-12 LAB
BASOPHILS # BLD AUTO: 0 10E3/UL (ref 0–0.2)
BASOPHILS NFR BLD AUTO: 1 %
CLUE CELLS: ABNORMAL
EOSINOPHIL # BLD AUTO: 0.4 10E3/UL (ref 0–0.7)
EOSINOPHIL NFR BLD AUTO: 8 %
ERYTHROCYTE [DISTWIDTH] IN BLOOD BY AUTOMATED COUNT: 11.6 % (ref 10–15)
HCT VFR BLD AUTO: 41.2 % (ref 35–47)
HGB BLD-MCNC: 13.9 G/DL (ref 11.7–15.7)
LYMPHOCYTES # BLD AUTO: 1.8 10E3/UL (ref 0.8–5.3)
LYMPHOCYTES NFR BLD AUTO: 36 %
MCH RBC QN AUTO: 32 PG (ref 26.5–33)
MCHC RBC AUTO-ENTMCNC: 33.7 G/DL (ref 31.5–36.5)
MCV RBC AUTO: 95 FL (ref 78–100)
MONOCYTES # BLD AUTO: 0.4 10E3/UL (ref 0–1.3)
MONOCYTES NFR BLD AUTO: 8 %
NEUTROPHILS # BLD AUTO: 2.3 10E3/UL (ref 1.6–8.3)
NEUTROPHILS NFR BLD AUTO: 47 %
PLATELET # BLD AUTO: 290 10E3/UL (ref 150–450)
RBC # BLD AUTO: 4.35 10E6/UL (ref 3.8–5.2)
TRICHOMONAS, WET PREP: ABNORMAL
TSH SERPL DL<=0.005 MIU/L-ACNC: 0.85 MU/L (ref 0.4–4)
WBC # BLD AUTO: 4.8 10E3/UL (ref 4–11)
WBC'S/HIGH POWER FIELD, WET PREP: ABNORMAL
YEAST, WET PREP: ABNORMAL

## 2021-08-12 PROCEDURE — 99395 PREV VISIT EST AGE 18-39: CPT | Performed by: PHYSICIAN ASSISTANT

## 2021-08-12 PROCEDURE — 87210 SMEAR WET MOUNT SALINE/INK: CPT | Performed by: PHYSICIAN ASSISTANT

## 2021-08-12 PROCEDURE — 36415 COLL VENOUS BLD VENIPUNCTURE: CPT

## 2021-08-12 PROCEDURE — 99213 OFFICE O/P EST LOW 20 MIN: CPT | Mod: 25 | Performed by: PHYSICIAN ASSISTANT

## 2021-08-12 PROCEDURE — 84443 ASSAY THYROID STIM HORMONE: CPT

## 2021-08-12 PROCEDURE — G0145 SCR C/V CYTO,THINLAYER,RESCR: HCPCS | Performed by: PHYSICIAN ASSISTANT

## 2021-08-12 PROCEDURE — 87624 HPV HI-RISK TYP POOLED RSLT: CPT | Performed by: PHYSICIAN ASSISTANT

## 2021-08-12 PROCEDURE — 85025 COMPLETE CBC W/AUTO DIFF WBC: CPT

## 2021-08-12 ASSESSMENT — MIFFLIN-ST. JEOR: SCORE: 1492.6

## 2021-08-12 NOTE — PROGRESS NOTES
SUBJECTIVE:   CC: Audra Almanzar is an 31 year old woman who presents for preventive health visit.       Patient has been advised of split billing requirements and indicates understanding: Yes     Healthy Habits:     Getting at least 3 servings of Calcium per day:  NO    Bi-annual eye exam:  NO    Dental care twice a year:  Yes    Sleep apnea or symptoms of sleep apnea:  None    Diet:  Regular (no restrictions)    Frequency of exercise:  1 day/week    Duration of exercise:  15-30 minutes    Taking medications regularly:  Yes    Medication side effects:  None    PHQ-2 Total Score: 1    Additional concerns today:  No    Got Covid, then had her wedding  Went on a mini honeymoon in [place]  She stayed on her birth control,   From December on, the color and the flow is different, shorter amount   She had it for 2 weeks in November after she skipped her period (on purpose in October)  Got Covid in September    Work and life has been stressful  Understaffed, feeling burned out over the last few months   They bought a house in September  Would like to go up to 20 mg of prozac    Omega 3, vitamin D, prenatal          Today's PHQ-2 Score:   PHQ-2 (  [employer]) 2021   Q1: Little interest or pleasure in doing things 1   Q2: Feeling down, depressed or hopeless 0   PHQ-2 Score 1   Q1: Little interest or pleasure in doing things Several days   Q2: Feeling down, depressed or hopeless Not at all   PHQ-2 Score 1       Abuse: Current or Past (Physical, Sexual or Emotional) - No  Do you feel safe in your environment? Yes    Have you ever done Advance Care Planning? (For example, a Health Directive, POLST, or a discussion with a medical provider or your loved ones about your wishes):     Social History     Tobacco Use     Smoking status: Former Smoker     Packs/day: 0.00     Types: Cigarettes     Quit date: 3/11/2014     Years since quittin.4     Smokeless tobacco: Never Used   Substance Use Topics     Alcohol use: Yes      Comment: 4 per week     If you drink alcohol do you typically have >3 drinks per day or >7 drinks per week? No    No flowsheet data found.    Reviewed orders with patient.  Reviewed health maintenance and updated orders accordingly - Yes  Lab work is in process  Labs reviewed in EPIC  BP Readings from Last 3 Encounters:   21 122/70   20 124/62   19 100/72    Wt Readings from Last 3 Encounters:   21 75.3 kg (166 lb)   20 70.1 kg (154 lb 8 oz)   19 73.9 kg (163 lb)                  Patient Active Problem List   Diagnosis     CARDIOVASCULAR SCREENING; LDL GOAL LESS THAN 130     Foot dermatitis     Mild persistent asthma     Dermatitis     Anxiety disorder     Mild persistent asthma, uncomplicated     Past Surgical History:   Procedure Laterality Date     ENT SURGERY         Social History     Tobacco Use     Smoking status: Former Smoker     Packs/day: 0.00     Types: Cigarettes     Quit date: 3/11/2014     Years since quittin.4     Smokeless tobacco: Never Used   Substance Use Topics     Alcohol use: Yes     Comment: 4 per week     Family History   Problem Relation Age of Onset     Gynecology Mother         abnormal cells on pap     Depression/Anxiety Mother      Asthma Father      Cancer Maternal Grandmother         bladder     Hypertension Maternal Grandmother      Pacemaker Maternal Grandfather      Chronic Obstructive Pulmonary Disease Maternal Grandfather      Heart Surgery Maternal Grandfather      Chronic Obstructive Pulmonary Disease Paternal Grandmother      Heart Failure Paternal Grandmother      Coronary Artery Disease Paternal Grandmother      Alcohol/Drug Brother          Current Outpatient Medications   Medication Sig Dispense Refill     albuterol (PROAIR HFA/PROVENTIL HFA/VENTOLIN HFA) 108 (90 Base) MCG/ACT inhaler Inhale 2 puffs into the lungs every 6 hours as needed for shortness of breath / dyspnea 3 Inhaler 3     Calcium Carb-Cholecalciferol (CALCIUM + D3  PO) Take 1 tablet by mouth daily       cetirizine (ZYRTEC ALLERGY) 10 MG tablet Take 1 tablet (10 mg) by mouth daily 90 tablet 3     desoximetasone (TOPICORT) 0.25 % Apply topically 2 times daily 3 Bottle 3     drospirenone-ethinyl estradiol (CLAUDIA) 3-0.03 MG tablet TAKE 1 TABLET BY MOUTH DAILY. 84 tablet 3     FLUoxetine (PROZAC) 20 MG capsule Take 1 capsule (20 mg) by mouth daily 60 capsule 0     mometasone-formoterol (DULERA) 200-5 MCG/ACT inhaler Inhale 2 puffs into the lungs 2 times daily 1 Inhaler 11     Omega-3 Fatty Acids (FISH OIL BURP-LESS) 1000 MG CAPS Take 1 capsule by mouth daily       Probiotic Product (PROBIOTIC PO) Take 1 capsule by mouth daily         Breast Cancer Screening:  Any new diagnosis of family breast, ovarian, or bowel cancer?     FHS-7: No flowsheet data found.    Patient under 40 years of age: Routine Mammogram Screening not recommended.   Pertinent mammograms are reviewed under the imaging tab.    History of abnormal Pap smear: NO - age 30-65 PAP every 5 years with negative HPV co-testing recommended  PAP / HPV Latest Ref Rng & Units 3/20/2018 3/11/2015 3/5/2014   PAP (Historical) - NIL NIL NIL   HPV16 NEG:Negative Negative Negative -   HPV18 NEG:Negative Negative Negative -   HRHPV NEG:Negative Negative Negative -     Reviewed and updated as needed this visit by clinical staff  Tobacco  Allergies  Meds   Med Hx  Surg Hx  Fam Hx  Soc Hx        Reviewed and updated as needed this visit by Provider                    Review of Systems  CONSTITUTIONAL: NEGATIVE for fever, chills, change in weight  INTEGUMENTARU/SKIN: NEGATIVE for worrisome rashes, moles or lesions  EYES: NEGATIVE for vision changes or irritation  ENT: NEGATIVE for ear, mouth and throat problems  RESP: NEGATIVE for significant cough or SOB  BREAST: NEGATIVE for masses, tenderness or discharge  CV: NEGATIVE for chest pain, palpitations or peripheral edema  GI: NEGATIVE for nausea, abdominal pain, heartburn, or change  "in bowel habits   female: no unusual urinary symptoms  MUSCULOSKELETAL: NEGATIVE for significant arthralgias or myalgia  NEURO: NEGATIVE for weakness, dizziness or paresthesias  PSYCHIATRIC: NEGATIVE forthoughts of hurting someone else and thoughts of self harm     OBJECTIVE:   /70   Pulse 78   Temp 97.2  F (36.2  C) (Temporal)   Ht 1.705 m (5' 7.13\")   Wt 75.3 kg (166 lb)   SpO2 100%   BMI 25.90 kg/m    Physical Exam  GENERAL: healthy, alert and no distress  EYES: Eyes grossly normal to inspection, PERRL and conjunctivae and sclerae normal  HENT: ear canals and TM's normal, nose and mouth without ulcers or lesions  NECK: no adenopathy, no asymmetry, masses, or scars and thyroid normal to palpation  RESP: lungs clear to auscultation - no rales, rhonchi or wheezes  BREAST: normal without masses, tenderness or nipple discharge and no palpable axillary masses or adenopathy  CV: regular rate and rhythm, normal S1 S2, no S3 or S4, no murmur, click or rub, no peripheral edema and peripheral pulses strong  ABDOMEN: soft, nontender, no hepatosplenomegaly, no masses and bowel sounds normal   (female): normal female external genitalia, normal urethral meatus, vaginal mucosa pink, moist, well rugated, and normal cervix/adnexa/uterus without masses. Bloody discharge (on her period)  MS: no gross musculoskeletal defects noted, no edema  SKIN: no suspicious lesions or rashes  NEURO: Normal strength and tone, mentation intact and speech normal  PSYCH: mentation appears normal, affect normal/bright    Diagnostic Test Results:  Labs reviewed in Epic  Results for orders placed or performed in visit on 08/12/21 (from the past 24 hour(s))   Wet prep - Clinic Collect    Specimen: Vagina; Swab   Result Value Ref Range    Trichomonas Absent Absent    Yeast Absent Absent    Clue Cells Absent Absent    WBCs/high power field 1+ (A) None       ASSESSMENT/PLAN:       ICD-10-CM    1. Routine general medical examination at a " "health care facility  Z00.00    2. CONSTANTINO (generalized anxiety disorder)  F41.1 FLUoxetine (PROZAC) 20 MG capsule   3. Screening for malignant neoplasm of cervix  Z12.4 PAP screen with HPV - recommended age 30 - 65 years     PAP screen with HPV - recommended age 30 - 65 years   4. Vaginal discharge  N89.8 Wet prep - Clinic Collect   5. Irregular periods  N92.6 CBC with platelets and differential     TSH with free T4 reflex       Patient has been advised of split billing requirements and indicates understanding: Yes  COUNSELING:  Reviewed preventive health counseling, as reflected in patient instructions    Estimated body mass index is 25.9 kg/m  as calculated from the following:    Height as of this encounter: 1.705 m (5' 7.13\").    Weight as of this encounter: 75.3 kg (166 lb).        She reports that she quit smoking about 7 years ago. Her smoking use included cigarettes. She smoked 0.00 packs per day. She has never used smokeless tobacco.      Counseling Resources:  ATP IV Guidelines  Pooled Cohorts Equation Calculator  Breast Cancer Risk Calculator  BRCA-Related Cancer Risk Assessment: FHS-7 Tool  FRAX Risk Assessment  ICSI Preventive Guidelines  Dietary Guidelines for Americans, 2010  USDA's MyPlate  ASA Prophylaxis  Lung CA Screening    Mora Riggs PA-C  [facility]  "

## 2021-08-12 NOTE — PATIENT INSTRUCTIONS
Labs today  Wait until these come back for next steps  Increase prozac to 20 mg daily  Recheck in 4-6 weeks  Return to clinic for any new or worsening symptoms or go to ER Urgent care in off hours    [facility]  [place]      Preventive Health Recommendations  Female Ages 26 - 39  Yearly exam:   See your health care provider every year in order to    Review health changes.     Discuss preventive care.      Review your medicines if you your doctor has prescribed any.    Until age 30: Get a Pap test every three years (more often if you have had an abnormal result).    After age 30: Talk to your doctor about whether you should have a Pap test every 3 years or have a Pap test with HPV screening every 5 years.   You do not need a Pap test if your uterus was removed (hysterectomy) and you have not had cancer.  You should be tested each year for STDs (sexually transmitted diseases), if you're at risk.   Talk to your provider about how often to have your cholesterol checked.  If you are at risk for diabetes, you should have a diabetes test (fasting glucose).  Shots: Get a flu shot each year. Get a tetanus shot every 10 years.   Nutrition:     Eat at least 5 servings of fruits and vegetables each day.    Eat whole-grain bread, whole-wheat pasta and brown rice instead of white grains and rice.    Get adequate Calcium and Vitamin D.     Lifestyle    Exercise at least 150 minutes a week (30 minutes a day, 5 days of the week). This will help you control your weight and prevent disease.    Limit alcohol to one drink per day.    No smoking.     Wear sunscreen to prevent skin cancer.    See your dentist every six months for an exam and cleaning.

## 2021-08-13 ASSESSMENT — ASTHMA QUESTIONNAIRES: ACT_TOTALSCORE: 24

## 2021-08-17 LAB
BKR LAB AP GYN ADEQUACY: NORMAL
BKR LAB AP GYN INTERPRETATION: NORMAL
BKR LAB AP HPV REFLEX: NORMAL
BKR LAB AP PREVIOUS ABNORMAL: NORMAL
PATH REPORT.COMMENTS IMP SPEC: NORMAL
PATH REPORT.RELEVANT HX SPEC: NORMAL

## 2021-08-18 LAB
HUMAN PAPILLOMA VIRUS 16 DNA: NEGATIVE
HUMAN PAPILLOMA VIRUS 18 DNA: NEGATIVE
HUMAN PAPILLOMA VIRUS FINAL DIAGNOSIS: NORMAL
HUMAN PAPILLOMA VIRUS OTHER HR: NEGATIVE

## 2021-09-25 ENCOUNTER — HEALTH MAINTENANCE LETTER (OUTPATIENT)
Age: 31
End: 2021-09-25

## 2021-11-08 ENCOUNTER — MYC MEDICAL ADVICE (OUTPATIENT)
Dept: FAMILY MEDICINE | Facility: CLINIC | Age: 31
End: 2021-11-08
Payer: COMMERCIAL

## 2021-11-08 DIAGNOSIS — F41.1 GAD (GENERALIZED ANXIETY DISORDER): ICD-10-CM

## 2021-11-09 NOTE — TELEPHONE ENCOUNTER
"Requested Prescriptions   Signed Prescriptions Disp Refills    FLUoxetine (PROZAC) 20 MG capsule 60 capsule 1     Sig: TAKE 1 CAPSULE BY MOUTH ONCE DAILY       SSRIs Protocol Passed - 11/9/2021  2:17 PM        Passed - Recent (12 mo) or future (30 days) visit within the authorizing provider's specialty     Patient has had an office visit with the authorizing provider or a provider within the authorizing providers department within the previous 12 mos or has a future within next 30 days. See \"Patient Info\" tab in inbasket, or \"Choose Columns\" in Meds & Orders section of the refill encounter.              Passed - Medication is active on med list        Passed - Patient is age 18 or older        Passed - No active pregnancy on record        Passed - No positive pregnancy test in last 12 months           Delmi Baez RN  [facility]     "

## 2022-01-03 ENCOUNTER — TELEPHONE (OUTPATIENT)
Dept: FAMILY MEDICINE | Facility: CLINIC | Age: 32
End: 2022-01-03
Payer: COMMERCIAL

## 2022-01-03 ENCOUNTER — TRANSFERRED RECORDS (OUTPATIENT)
Dept: HEALTH INFORMATION MANAGEMENT | Facility: CLINIC | Age: 32
End: 2022-01-03
Payer: COMMERCIAL

## 2022-01-03 DIAGNOSIS — O20.9 FIRST TRIMESTER BLEEDING: Primary | ICD-10-CM

## 2022-01-03 NOTE — TELEPHONE ENCOUNTER
Patient called with results from the [facility] lab.  Her quantitative beta-hCG was 8 which is much lower than I would expect given her estimated gestational age.  Shared with patient that I believe she is most likely miscarrying at this time.  Indications for seeking emergent care were again reviewed including but not limited to pain levels and bleeding.  Discussed expectant versus more proactive management and she seems comfortable with expectant management at this time.  Urged her to call if any other questions come up over the next few days.    Torey Dutton MD

## 2022-01-03 NOTE — TELEPHONE ENCOUNTER
The TC asked about this patient earlier, I advised that she be triaged by [facility] since she hadn't been seen by us previously.  Do you know if this patient would be planning on coming here for her OB care?  Given that she lives in [facility], she may not want to be doing follow-ups here and she may be best served by a clinic in her area.  Ashwini Gonzalez RN

## 2022-01-03 NOTE — TELEPHONE ENCOUNTER
[facility] - Please see messages below. Would any of the OB providers be able to see pt in the next couple days?    Milli Thornton RN  [facility]

## 2022-01-03 NOTE — TELEPHONE ENCOUNTER
She may be miscarrying and should consider seeing OB/GYn, please see if she can get scheduled with them.  She needs to go to the ER if she is soaking more than one pad per hour or has pain not manageable with ibuprofen.      Please call to let her know.    Torey Dutton MD

## 2022-01-03 NOTE — TELEPHONE ENCOUNTER
Called patient to review symptoms she is having.  She was expecting her.  But check a UPT that was faintly positive on 12/30/2021.  Today she developed some brownish to red bleeding as well as some mild discomfort.  She would like to know if she is miscarrying and was requesting to have a blood level done.  After reviewing with the patient indications for seeking emergent care and the high likelihood of a first trimester miscarriage I did put in an order for a quantitative beta hCG to be done in the blood.  She asked that this order be faxed to her so she could potentially have it done at [facility] or [facility].    Torey Dutton MD

## 2022-01-03 NOTE — TELEPHONE ENCOUNTER
Paper orders were faxed to dharmesh at [phone].     Orders also uploaded on Epic.     Vanessa Mello,      [facility]

## 2022-01-03 NOTE — TELEPHONE ENCOUNTER
POD,    Patient called with concerns about heavy bleeding. Per pt she found out via pregnancy test that she is pregnant on the 30th. Concerned she is having a miscarriage. Would any be willing to sign an hcg lab? Or what would the recommendation be?    Ok to call pt at,   [phone], ok for dvm.     Thanks,  DARRICK Awad  [facility]

## 2022-04-25 ENCOUNTER — MYC MEDICAL ADVICE (OUTPATIENT)
Dept: FAMILY MEDICINE | Facility: CLINIC | Age: 32
End: 2022-04-25
Payer: COMMERCIAL

## 2022-04-25 DIAGNOSIS — N91.2 AMENORRHEA: Primary | ICD-10-CM

## 2022-04-26 ENCOUNTER — LAB (OUTPATIENT)
Dept: LAB | Facility: CLINIC | Age: 32
End: 2022-04-26
Payer: COMMERCIAL

## 2022-04-26 ENCOUNTER — MYC MEDICAL ADVICE (OUTPATIENT)
Dept: FAMILY MEDICINE | Facility: CLINIC | Age: 32
End: 2022-04-26

## 2022-04-26 DIAGNOSIS — N91.2 AMENORRHEA: Primary | ICD-10-CM

## 2022-04-26 DIAGNOSIS — N91.2 AMENORRHEA: ICD-10-CM

## 2022-04-26 LAB — HCG SERPL-ACNC: 31 MLU/ML (ref 0–4)

## 2022-04-26 PROCEDURE — 36415 COLL VENOUS BLD VENIPUNCTURE: CPT

## 2022-04-26 PROCEDURE — 84702 CHORIONIC GONADOTROPIN TEST: CPT

## 2022-04-27 NOTE — TELEPHONE ENCOUNTER
Dr. Dutton,     It looks like you have helped with this pt in past. Can you please see mychart? I feel patient needs an OB referral and she really needs to est care.     Thanks,  DARRICK Awad  [facility]

## 2022-04-28 ENCOUNTER — LAB (OUTPATIENT)
Dept: LAB | Facility: CLINIC | Age: 32
End: 2022-04-28
Payer: COMMERCIAL

## 2022-04-28 DIAGNOSIS — N91.2 AMENORRHEA: ICD-10-CM

## 2022-04-28 LAB — HCG SERPL-ACNC: 20 MLU/ML (ref 0–4)

## 2022-04-28 PROCEDURE — 84702 CHORIONIC GONADOTROPIN TEST: CPT

## 2022-04-28 PROCEDURE — 36415 COLL VENOUS BLD VENIPUNCTURE: CPT

## 2022-04-29 ENCOUNTER — MYC MEDICAL ADVICE (OUTPATIENT)
Dept: FAMILY MEDICINE | Facility: CLINIC | Age: 32
End: 2022-04-29
Payer: COMMERCIAL

## 2022-12-26 ENCOUNTER — HEALTH MAINTENANCE LETTER (OUTPATIENT)
Age: 32
End: 2022-12-26

## 2024-02-04 ENCOUNTER — HEALTH MAINTENANCE LETTER (OUTPATIENT)
Age: 34
End: 2024-02-04

## 2025-03-02 ENCOUNTER — HEALTH MAINTENANCE LETTER (OUTPATIENT)
Age: 35
End: 2025-03-02